# Patient Record
Sex: MALE | Employment: FULL TIME | URBAN - METROPOLITAN AREA
[De-identification: names, ages, dates, MRNs, and addresses within clinical notes are randomized per-mention and may not be internally consistent; named-entity substitution may affect disease eponyms.]

---

## 2017-03-08 ENCOUNTER — PATIENT OUTREACH (OUTPATIENT)
Dept: INTERNAL MEDICINE CLINIC | Facility: CLINIC | Age: 52
End: 2017-03-08

## 2017-03-08 NOTE — PROGRESS NOTES
Reminder letter mailed to patient advising him to complete, labs, eye exam and schedule a follow up appointment.

## 2017-11-10 ENCOUNTER — TELEPHONE (OUTPATIENT)
Dept: INTERNAL MEDICINE CLINIC | Facility: CLINIC | Age: 52
End: 2017-11-10

## 2017-11-10 NOTE — TELEPHONE ENCOUNTER
Patient's spouse calling to request order for blood work for patient. Patient is new patient and is changing with Dr. Sean Long and has appointment on 01/19/18.  Please call patient back

## 2017-11-13 NOTE — TELEPHONE ENCOUNTER
Wife informed pt will need to establish with Dr Camacho Chaparro first than labs will be ordered at time of visit with him.

## 2018-01-19 ENCOUNTER — OFFICE VISIT (OUTPATIENT)
Dept: INTERNAL MEDICINE CLINIC | Facility: CLINIC | Age: 53
End: 2018-01-19

## 2018-01-19 ENCOUNTER — LAB ENCOUNTER (OUTPATIENT)
Dept: LAB | Age: 53
End: 2018-01-19
Attending: INTERNAL MEDICINE
Payer: COMMERCIAL

## 2018-01-19 VITALS
HEART RATE: 78 BPM | RESPIRATION RATE: 19 BRPM | DIASTOLIC BLOOD PRESSURE: 82 MMHG | BODY MASS INDEX: 28.1 KG/M2 | TEMPERATURE: 98 F | WEIGHT: 200.75 LBS | HEIGHT: 70.75 IN | SYSTOLIC BLOOD PRESSURE: 120 MMHG

## 2018-01-19 DIAGNOSIS — E78.00 HYPERCHOLESTEROLEMIA: ICD-10-CM

## 2018-01-19 DIAGNOSIS — Z12.11 SCREENING FOR COLORECTAL CANCER: ICD-10-CM

## 2018-01-19 DIAGNOSIS — E66.3 OVERWEIGHT (BMI 25.0-29.9): ICD-10-CM

## 2018-01-19 DIAGNOSIS — E11.9 CONTROLLED TYPE 2 DIABETES MELLITUS WITHOUT COMPLICATION, WITHOUT LONG-TERM CURRENT USE OF INSULIN (HCC): ICD-10-CM

## 2018-01-19 DIAGNOSIS — Z12.12 SCREENING FOR COLORECTAL CANCER: ICD-10-CM

## 2018-01-19 DIAGNOSIS — Z12.5 SCREENING PSA (PROSTATE SPECIFIC ANTIGEN): ICD-10-CM

## 2018-01-19 DIAGNOSIS — M51.9 LUMBAR DISC DISEASE: ICD-10-CM

## 2018-01-19 DIAGNOSIS — E11.9 CONTROLLED TYPE 2 DIABETES MELLITUS WITHOUT COMPLICATION, WITHOUT LONG-TERM CURRENT USE OF INSULIN (HCC): Primary | ICD-10-CM

## 2018-01-19 LAB
ALBUMIN SERPL-MCNC: 3.9 G/DL (ref 3.5–4.8)
ALP LIVER SERPL-CCNC: 64 U/L (ref 45–117)
ALT SERPL-CCNC: 36 U/L (ref 17–63)
AST SERPL-CCNC: 25 U/L (ref 15–41)
BASOPHILS # BLD AUTO: 0.03 X10(3) UL (ref 0–0.1)
BASOPHILS NFR BLD AUTO: 0.4 %
BILIRUB SERPL-MCNC: 0.9 MG/DL (ref 0.1–2)
BILIRUB UR QL STRIP.AUTO: NEGATIVE
BUN BLD-MCNC: 13 MG/DL (ref 8–20)
CALCIUM BLD-MCNC: 8.2 MG/DL (ref 8.3–10.3)
CHLORIDE: 106 MMOL/L (ref 101–111)
CHOLEST SMN-MCNC: 117 MG/DL (ref ?–200)
CO2: 26 MMOL/L (ref 22–32)
COLOR UR AUTO: YELLOW
COMPLEXED PSA SERPL-MCNC: 0.33 NG/ML (ref 0.01–4)
CREAT BLD-MCNC: 0.72 MG/DL (ref 0.7–1.3)
CREAT UR-SCNC: 340 MG/DL
EOSINOPHIL # BLD AUTO: 0.09 X10(3) UL (ref 0–0.3)
EOSINOPHIL NFR BLD AUTO: 1.3 %
ERYTHROCYTE [DISTWIDTH] IN BLOOD BY AUTOMATED COUNT: 11.9 % (ref 11.5–16)
EST. AVERAGE GLUCOSE BLD GHB EST-MCNC: 226 MG/DL (ref 68–126)
GLUCOSE BLD-MCNC: 168 MG/DL (ref 70–99)
GLUCOSE UR STRIP.AUTO-MCNC: NEGATIVE MG/DL
HBA1C MFR BLD HPLC: 9.5 % (ref ?–5.7)
HCT VFR BLD AUTO: 45.8 % (ref 37–53)
HDLC SERPL-MCNC: 27 MG/DL (ref 45–?)
HDLC SERPL: 4.33 {RATIO} (ref ?–4.97)
HGB BLD-MCNC: 16.3 G/DL (ref 13–17)
IMMATURE GRANULOCYTE COUNT: 0.04 X10(3) UL (ref 0–1)
IMMATURE GRANULOCYTE RATIO %: 0.6 %
KETONES UR STRIP.AUTO-MCNC: NEGATIVE MG/DL
LDLC SERPL DIRECT ASSAY-MCNC: 36 MG/DL (ref ?–100)
LEUKOCYTE ESTERASE UR QL STRIP.AUTO: NEGATIVE
LYMPHOCYTES # BLD AUTO: 2.91 X10(3) UL (ref 0.9–4)
LYMPHOCYTES NFR BLD AUTO: 40.6 %
M PROTEIN MFR SERPL ELPH: 7 G/DL (ref 6.1–8.3)
MCH RBC QN AUTO: 31.8 PG (ref 27–33.2)
MCHC RBC AUTO-ENTMCNC: 35.6 G/DL (ref 31–37)
MCV RBC AUTO: 89.3 FL (ref 80–99)
MICROALBUMIN UR-MCNC: 9.4 MG/DL
MICROALBUMIN/CREAT 24H UR-RTO: 27.6 UG/MG (ref ?–30)
MONOCYTES # BLD AUTO: 0.65 X10(3) UL (ref 0.1–0.6)
MONOCYTES NFR BLD AUTO: 9.1 %
NEUTROPHIL ABS PRELIM: 3.44 X10 (3) UL (ref 1.3–6.7)
NEUTROPHILS # BLD AUTO: 3.44 X10(3) UL (ref 1.3–6.7)
NEUTROPHILS NFR BLD AUTO: 48 %
NITRITE UR QL STRIP.AUTO: NEGATIVE
NONHDLC SERPL-MCNC: 90 MG/DL (ref ?–130)
PH UR STRIP.AUTO: 5 [PH] (ref 4.5–8)
PLATELET # BLD AUTO: 176 10(3)UL (ref 150–450)
POTASSIUM SERPL-SCNC: 4.1 MMOL/L (ref 3.6–5.1)
PROT UR STRIP.AUTO-MCNC: 30 MG/DL
RBC # BLD AUTO: 5.13 X10(6)UL (ref 4.3–5.7)
RBC UR QL AUTO: NEGATIVE
RED CELL DISTRIBUTION WIDTH-SD: 38.5 FL (ref 35.1–46.3)
SODIUM SERPL-SCNC: 138 MMOL/L (ref 136–144)
SP GR UR STRIP.AUTO: 1.03 (ref 1–1.03)
TRIGL SERPL-MCNC: 493 MG/DL (ref ?–150)
TSI SER-ACNC: 0.74 MIU/ML (ref 0.35–5.5)
UROBILINOGEN UR STRIP.AUTO-MCNC: <2 MG/DL
WBC # BLD AUTO: 7.2 X10(3) UL (ref 4–13)

## 2018-01-19 PROCEDURE — 84153 ASSAY OF PSA TOTAL: CPT | Performed by: INTERNAL MEDICINE

## 2018-01-19 PROCEDURE — 82570 ASSAY OF URINE CREATININE: CPT | Performed by: INTERNAL MEDICINE

## 2018-01-19 PROCEDURE — 83036 HEMOGLOBIN GLYCOSYLATED A1C: CPT | Performed by: INTERNAL MEDICINE

## 2018-01-19 PROCEDURE — 80061 LIPID PANEL: CPT | Performed by: INTERNAL MEDICINE

## 2018-01-19 PROCEDURE — 80050 GENERAL HEALTH PANEL: CPT | Performed by: INTERNAL MEDICINE

## 2018-01-19 PROCEDURE — 81001 URINALYSIS AUTO W/SCOPE: CPT | Performed by: INTERNAL MEDICINE

## 2018-01-19 PROCEDURE — 82043 UR ALBUMIN QUANTITATIVE: CPT | Performed by: INTERNAL MEDICINE

## 2018-01-19 PROCEDURE — 36415 COLL VENOUS BLD VENIPUNCTURE: CPT | Performed by: INTERNAL MEDICINE

## 2018-01-19 PROCEDURE — 99214 OFFICE O/P EST MOD 30 MIN: CPT | Performed by: INTERNAL MEDICINE

## 2018-01-19 PROCEDURE — 83721 ASSAY OF BLOOD LIPOPROTEIN: CPT | Performed by: INTERNAL MEDICINE

## 2018-01-19 RX ORDER — ATORVASTATIN CALCIUM 40 MG/1
40 TABLET, FILM COATED ORAL
Qty: 90 TABLET | Refills: 1 | Status: SHIPPED | OUTPATIENT
Start: 2018-01-19 | End: 2018-09-11

## 2018-01-19 RX ORDER — GLIMEPIRIDE 2 MG/1
2 TABLET ORAL 2 TIMES DAILY WITH MEALS
Qty: 180 TABLET | Refills: 1 | Status: SHIPPED | OUTPATIENT
Start: 2018-01-19 | End: 2018-09-11

## 2018-01-19 RX ORDER — LANCETS 33 GAUGE
1 EACH MISCELLANEOUS 2 TIMES DAILY
Qty: 200 EACH | Refills: 0 | Status: SHIPPED | OUTPATIENT
Start: 2018-01-19 | End: 2019-02-12

## 2018-01-20 PROBLEM — M51.9 LUMBAR DISC DISEASE: Status: ACTIVE | Noted: 2018-01-20

## 2018-01-20 PROBLEM — E66.3 OVERWEIGHT (BMI 25.0-29.9): Status: ACTIVE | Noted: 2018-01-20

## 2018-01-20 NOTE — PROGRESS NOTES
Patient presents with:  Establish Care: ongoing medical mgmt of chronic disease      HPI: The pt presents today to establish PCP and for ongoing medical mgmt of chronic disease burden. Former patient of Dr. Masha Hu within [facility].   [facility] Take 1 tablet (40 mg total) by mouth once daily. , Disp: 90 tablet, Rfl: 1  •  LITETOUCH LANCETS Does not apply Misc, 1 lancet by Finger stick route 2 (two) times daily.  E11.42, Disp: 200 each, Rfl: 0  •  Glucose Blood In Vitro Strip, Test blood sugar twice monofilament/sensation of both feet is normal.  Pulsation pedal pulse exam of both lower legs/feet is normal as well.    Back - spine is midline and w/o tenderness; ROM is preserved  Neuro - CNs 2-12 grossly intact, no focal deficits; 2+ DTRs  Psych - nl mo [facility]  [address] [facility],4Th Floor, [address], [facility], [address]  T: [id number]; F: [phone]       Orders Placed This Encounter      HGB A1C - CPX      LIPID PANEL - CPX      TSH - CPX      URINALYSIS, ROUTINE - CPX      CBC W/DIFF

## 2018-02-01 ENCOUNTER — TELEPHONE (OUTPATIENT)
Dept: INTERNAL MEDICINE CLINIC | Facility: CLINIC | Age: 53
End: 2018-02-01

## 2018-04-16 DIAGNOSIS — E11.9 CONTROLLED TYPE 2 DIABETES MELLITUS WITHOUT COMPLICATION, WITHOUT LONG-TERM CURRENT USE OF INSULIN (HCC): ICD-10-CM

## 2018-04-16 NOTE — TELEPHONE ENCOUNTER
Refill requested: One Touch Ultra Blue     Passed protocol    Relevant Labs: 1/19/18 M/A and HA1C 9.5  Last OV / RTC advised: 1/19/18  Appt Scheduled:   Your appointments     Date & Time Appointment Department [facility])    Jun 18, 2018  3:30 PM CDT [place]

## 2018-06-17 ENCOUNTER — HOSPITAL ENCOUNTER (OUTPATIENT)
Age: 53
Discharge: HOME | End: 2018-06-17
Payer: COMMERCIAL

## 2018-06-17 VITALS
HEIGHT: 71 IN | OXYGEN SATURATION: 96 % | RESPIRATION RATE: 20 BRPM | WEIGHT: 216 LBS | HEART RATE: 79 BPM | DIASTOLIC BLOOD PRESSURE: 85 MMHG | TEMPERATURE: 100 F | BODY MASS INDEX: 30.24 KG/M2 | SYSTOLIC BLOOD PRESSURE: 135 MMHG

## 2018-06-17 DIAGNOSIS — R09.82 POST-NASAL DRIP: ICD-10-CM

## 2018-06-17 DIAGNOSIS — B02.9 HERPES ZOSTER WITHOUT COMPLICATION: Primary | ICD-10-CM

## 2018-06-17 PROCEDURE — 99213 OFFICE O/P EST LOW 20 MIN: CPT

## 2018-06-17 PROCEDURE — 99204 OFFICE O/P NEW MOD 45 MIN: CPT

## 2018-06-17 RX ORDER — VALACYCLOVIR HYDROCHLORIDE 1 G/1
1 TABLET, FILM COATED ORAL 3 TIMES DAILY
Qty: 21 TABLET | Refills: 0 | Status: SHIPPED | OUTPATIENT
Start: 2018-06-17 | End: 2018-06-24

## 2018-06-17 NOTE — ED PROVIDER NOTES
Patient Seen in: [facility] In [facility]    History   Patient presents with:  Rash Skin Problem (integumentary)    Stated Complaint: RASH     HPI     52-year-old male here with complaint of a several day history of a painful burning rash to Current:/85   Pulse 79   Temp 99.6 °F (37.6 °C) (Temporal)   Resp 20   Ht 180.3 cm (5' 11\")   Wt 98 kg   SpO2 96%   BMI 30.13 kg/m²         Physical Exam   Constitutional: He is oriented to person, place, and time.  He appears well-developed and well I discussed the plan of care with the patient, who expresses understanding. All questions and concerns are addressed to the patients satisfaction prior to discharge today.            Disposition and Plan     Clinical Impression:  Herpes zoster without compl

## 2018-07-30 DIAGNOSIS — E11.9 CONTROLLED TYPE 2 DIABETES MELLITUS WITHOUT COMPLICATION, WITHOUT LONG-TERM CURRENT USE OF INSULIN (HCC): ICD-10-CM

## 2018-07-30 NOTE — TELEPHONE ENCOUNTER
Patient requesting refill for:  [facility] SYS [facility] In [facility], [address]  [address] [place] [address], [phone], [phone]

## 2018-07-31 NOTE — TELEPHONE ENCOUNTER
Requesting ONETOUCH ULTRA BLUE In Vitro Strip  LOV: 01/19/18  Filled: 04/16/2018 #200 with 0 refills

## 2018-09-11 DIAGNOSIS — E78.00 HYPERCHOLESTEROLEMIA: ICD-10-CM

## 2018-09-11 DIAGNOSIS — E11.9 CONTROLLED TYPE 2 DIABETES MELLITUS WITHOUT COMPLICATION, WITHOUT LONG-TERM CURRENT USE OF INSULIN (HCC): ICD-10-CM

## 2018-09-11 NOTE — TELEPHONE ENCOUNTER
Glimepiride 2 mg 1 tab bid filled 1-19-18 180 with 1 refill     Metformin 850 mg 1 tab bid filled  1-19-18 180 with 1 refill     Atorvastatin 40 mg 1 tab daily filled 1-19-18 90 with 1 refill

## 2018-09-12 RX ORDER — ATORVASTATIN CALCIUM 40 MG/1
40 TABLET, FILM COATED ORAL DAILY
Qty: 90 TABLET | Refills: 0 | Status: SHIPPED | OUTPATIENT
Start: 2018-09-12 | End: 2018-11-30

## 2018-09-12 NOTE — TELEPHONE ENCOUNTER
Refill requested: Glimepiride 2 mg + Atorvastatin 40 mg + Metformin 850 mg     Failed protocol - Glimepiride + Metformin       Last refill: 1/19/18 Metformin 850 mg #180 1R + Glimepiride 2 mg #1801R + Atorvastatin 40 mg #90 1R    Relevant Labs: Lipid _+ A1

## 2018-09-13 RX ORDER — GLIMEPIRIDE 2 MG/1
2 TABLET ORAL 2 TIMES DAILY WITH MEALS
Qty: 180 TABLET | Refills: 0 | Status: SHIPPED | OUTPATIENT
Start: 2018-09-13 | End: 2019-02-22

## 2018-10-26 ENCOUNTER — TELEPHONE (OUTPATIENT)
Dept: INTERNAL MEDICINE CLINIC | Facility: CLINIC | Age: 53
End: 2018-10-26

## 2018-10-26 DIAGNOSIS — E11.9 CONTROLLED TYPE 2 DIABETES MELLITUS WITHOUT COMPLICATION, WITHOUT LONG-TERM CURRENT USE OF INSULIN (HCC): ICD-10-CM

## 2018-10-26 DIAGNOSIS — E11.65 UNCONTROLLED TYPE 2 DIABETES MELLITUS WITH HYPERGLYCEMIA, WITHOUT LONG-TERM CURRENT USE OF INSULIN (HCC): Primary | ICD-10-CM
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
## 2018-10-26 NOTE — TELEPHONE ENCOUNTER
Find out specifically - Is it lancets, testing strips, glucometer? Jamshid Hall. Valerie Mendez MD  Diplomate, American Board of Internal Medicine  [facility]  [address] [facility],4Th Floor, [address], [facility], [address]  T: [phone]; F: [address]

## 2018-10-28 DIAGNOSIS — E11.9 CONTROLLED TYPE 2 DIABETES MELLITUS WITHOUT COMPLICATION, WITHOUT LONG-TERM CURRENT USE OF INSULIN (HCC): ICD-10-CM

## 2018-10-29 RX ORDER — BLOOD-GLUCOSE METER
1 EACH MISCELLANEOUS 2 TIMES DAILY
Qty: 1 KIT | Refills: 0 | Status: SHIPPED | OUTPATIENT
Start: 2018-10-29 | End: 2019-02-12

## 2018-11-30 ENCOUNTER — LAB ENCOUNTER (OUTPATIENT)
Dept: LAB | Age: 53
End: 2018-11-30
Attending: INTERNAL MEDICINE
Payer: COMMERCIAL

## 2018-11-30 ENCOUNTER — OFFICE VISIT (OUTPATIENT)
Dept: INTERNAL MEDICINE CLINIC | Facility: CLINIC | Age: 53
End: 2018-11-30
Payer: COMMERCIAL

## 2018-11-30 VITALS
TEMPERATURE: 98 F | OXYGEN SATURATION: 95 % | DIASTOLIC BLOOD PRESSURE: 78 MMHG | HEIGHT: 70.75 IN | BODY MASS INDEX: 32.55 KG/M2 | WEIGHT: 232.5 LBS | SYSTOLIC BLOOD PRESSURE: 120 MMHG | HEART RATE: 71 BPM

## 2018-11-30 DIAGNOSIS — E11.65 UNCONTROLLED TYPE 2 DIABETES MELLITUS WITH HYPERGLYCEMIA, WITHOUT LONG-TERM CURRENT USE OF INSULIN (HCC): Primary | ICD-10-CM

## 2018-11-30 DIAGNOSIS — E78.00 HYPERCHOLESTEROLEMIA: ICD-10-CM

## 2018-11-30 DIAGNOSIS — E11.65 UNCONTROLLED TYPE 2 DIABETES MELLITUS WITH HYPERGLYCEMIA, WITHOUT LONG-TERM CURRENT USE OF INSULIN (HCC): ICD-10-CM

## 2018-11-30 DIAGNOSIS — E66.09 CLASS 1 OBESITY DUE TO EXCESS CALORIES WITH SERIOUS COMORBIDITY AND BODY MASS INDEX (BMI) OF 32.0 TO 32.9 IN ADULT: ICD-10-CM

## 2018-11-30 PROBLEM — E66.811 CLASS 1 OBESITY DUE TO EXCESS CALORIES WITH SERIOUS COMORBIDITY AND BODY MASS INDEX (BMI) OF 32.0 TO 32.9 IN ADULT: Chronic | Status: ACTIVE | Noted: 2018-01-20

## 2018-11-30 PROCEDURE — 82570 ASSAY OF URINE CREATININE: CPT | Performed by: INTERNAL MEDICINE

## 2018-11-30 PROCEDURE — 80053 COMPREHEN METABOLIC PANEL: CPT | Performed by: INTERNAL MEDICINE

## 2018-11-30 PROCEDURE — 80061 LIPID PANEL: CPT | Performed by: INTERNAL MEDICINE

## 2018-11-30 PROCEDURE — 83721 ASSAY OF BLOOD LIPOPROTEIN: CPT | Performed by: INTERNAL MEDICINE

## 2018-11-30 PROCEDURE — 99214 OFFICE O/P EST MOD 30 MIN: CPT | Performed by: INTERNAL MEDICINE

## 2018-11-30 PROCEDURE — 36415 COLL VENOUS BLD VENIPUNCTURE: CPT | Performed by: INTERNAL MEDICINE

## 2018-11-30 PROCEDURE — 82043 UR ALBUMIN QUANTITATIVE: CPT | Performed by: INTERNAL MEDICINE

## 2018-11-30 PROCEDURE — 83036 HEMOGLOBIN GLYCOSYLATED A1C: CPT | Performed by: INTERNAL MEDICINE

## 2018-11-30 RX ORDER — ATORVASTATIN CALCIUM 40 MG/1
40 TABLET, FILM COATED ORAL DAILY
Qty: 90 TABLET | Refills: 0 | Status: SHIPPED | OUTPATIENT
Start: 2018-11-30 | End: 2019-02-22

## 2018-11-30 RX ORDER — GLIMEPIRIDE 2 MG/1
2 TABLET ORAL 2 TIMES DAILY WITH MEALS
Qty: 180 TABLET | Refills: 0 | Status: CANCELLED | OUTPATIENT
Start: 2018-11-30

## 2018-11-30 NOTE — PATIENT INSTRUCTIONS
Onofre,    1. Get labs done. 2. Take new medication called INVOKANA which is a once-daily medication for diabetes. 3. Continue METFORMIN as scheduled. 4. Bye bye GLIMEPIRIDE. 5. See the ophthalmologist, Dr. Jg Marks. 6. See you back in 3 months.

## 2018-11-30 NOTE — PROGRESS NOTES
Patient presents with: Follow - Up: 6-month chronic medical conditions      HPI: Zhang Hernandezbuffy presents today for 6-month f/u chronic medical conditions as follows:    1. Uncontrolled DM2 w/ hyperglycemia w/o insulin - Due for labs. Due for Ophtho.   Compliant 240 g Oral Recon Soln, Take as directed by your physician., Disp: 1 Bottle, Rfl: 0    Social History    Tobacco Use      Smoking status: Former Smoker        Packs/day: 0.50        Quit date: 2012        Years since quittin.8      Smokeless tobacco Lashay Mckeon. Gwendlyn Jeans, MD  Diplomate, American Board of Internal Medicine  [facility]  [address], [facility], [address]  T: [phone]; F: [phone]     Meds & Refills for this Visit:  Requested Prescriptions     Signed Prescr

## 2018-12-01 RX ORDER — ICOSAPENT ETHYL 1000 MG/1
2 CAPSULE ORAL 2 TIMES DAILY
Qty: 120 CAPSULE | Refills: 5 | Status: SHIPPED | OUTPATIENT
Start: 2018-12-01 | End: 2019-10-13

## 2018-12-01 NOTE — PROGRESS NOTES
Script for [employer] sent to pharmacy. Pt notified via [address] Jyl Aid. Maria Gaudalupe Kennedy MD  Diplomate, American Board of Internal Medicine  [address] [facility]  [address] [place] [address],[address]Th Floor, [address], [facility], [address]  T: [phone]; F: [address]

## 2018-12-06 PROBLEM — Z12.11 SPECIAL SCREENING FOR MALIGNANT NEOPLASM OF COLON: Status: ACTIVE | Noted: 2018-12-06

## 2018-12-06 PROBLEM — K57.30 DIVERTICULOSIS OF LARGE INTESTINE WITHOUT PERFORATION OR ABSCESS WITHOUT BLEEDING: Status: ACTIVE | Noted: 2018-12-06

## 2018-12-06 PROBLEM — K63.5 POLYP OF COLON: Status: ACTIVE | Noted: 2018-12-06

## 2018-12-10 ENCOUNTER — TELEPHONE (OUTPATIENT)
Dept: INTERNAL MEDICINE CLINIC | Facility: CLINIC | Age: 53
End: 2018-12-10

## 2018-12-10 NOTE — TELEPHONE ENCOUNTER
Received a PA request for Vascepa. ID: [id number]    -Completed PA form and placed in Dr Chioma Byrd for signature.

## 2018-12-19 ENCOUNTER — TELEPHONE (OUTPATIENT)
Dept: INTERNAL MEDICINE CLINIC | Facility: CLINIC | Age: 53
End: 2018-12-19

## 2018-12-31 NOTE — TELEPHONE ENCOUNTER
Faxed back questionnaire to [facility] and placed in [employer] [facility] [employer] POD 1

## 2019-01-04 NOTE — TELEPHONE ENCOUNTER
In my Outbox. Fritz Morejon. Camacho Chaparro MD  Diplomate, American Board of Internal Medicine  [facility]  [address] [facility],4Th Floor, [address], [facility], [address]  T: [phone]; F: Kayli 5

## 2019-02-12 RX ORDER — LANCETS
1 EACH MISCELLANEOUS 2 TIMES DAILY
Qty: 2 BOX | Refills: 2 | Status: SHIPPED | OUTPATIENT
Start: 2019-02-12 | End: 2019-07-29

## 2019-02-12 RX ORDER — BLOOD SUGAR DIAGNOSTIC
STRIP MISCELLANEOUS
Qty: 200 STRIP | Refills: 2 | Status: SHIPPED | OUTPATIENT
Start: 2019-02-12 | End: 2019-02-22

## 2019-02-12 RX ORDER — BLOOD-GLUCOSE METER
1 EACH MISCELLANEOUS 2 TIMES DAILY
Qty: 1 KIT | Refills: 0 | Status: SHIPPED | OUTPATIENT
Start: 2019-02-12 | End: 2019-07-29

## 2019-02-12 NOTE — TELEPHONE ENCOUNTER
accu-chek lancets bid filled     Accu-chek test strips bid filled     accu-chek monitor filled     Insurance does not cover onetouch     LOV 11-30-18     Uncontrolled DM2 w/ hyperglycemia w/o insulin - Due for labs. Due for Ophtho.   Compliant w/ prescript

## 2019-02-22 ENCOUNTER — OFFICE VISIT (OUTPATIENT)
Dept: INTERNAL MEDICINE CLINIC | Facility: CLINIC | Age: 54
End: 2019-02-22
Payer: COMMERCIAL

## 2019-02-22 ENCOUNTER — TELEPHONE (OUTPATIENT)
Dept: INTERNAL MEDICINE CLINIC | Facility: CLINIC | Age: 54
End: 2019-02-22

## 2019-02-22 ENCOUNTER — APPOINTMENT (OUTPATIENT)
Dept: LAB | Age: 54
End: 2019-02-22
Attending: INTERNAL MEDICINE
Payer: COMMERCIAL

## 2019-02-22 VITALS
BODY MASS INDEX: 31.68 KG/M2 | RESPIRATION RATE: 16 BRPM | HEIGHT: 70.75 IN | WEIGHT: 226.25 LBS | DIASTOLIC BLOOD PRESSURE: 72 MMHG | SYSTOLIC BLOOD PRESSURE: 109 MMHG | HEART RATE: 64 BPM | TEMPERATURE: 98 F

## 2019-02-22 DIAGNOSIS — E11.9 CONTROLLED TYPE 2 DIABETES MELLITUS WITHOUT COMPLICATION, WITHOUT LONG-TERM CURRENT USE OF INSULIN (HCC): Primary | ICD-10-CM

## 2019-02-22 DIAGNOSIS — E11.9 CONTROLLED TYPE 2 DIABETES MELLITUS WITHOUT COMPLICATION, WITHOUT LONG-TERM CURRENT USE OF INSULIN (HCC): ICD-10-CM

## 2019-02-22 DIAGNOSIS — E66.09 CLASS 1 OBESITY DUE TO EXCESS CALORIES WITH SERIOUS COMORBIDITY AND BODY MASS INDEX (BMI) OF 31.0 TO 31.9 IN ADULT: ICD-10-CM

## 2019-02-22 DIAGNOSIS — E78.00 HYPERCHOLESTEROLEMIA: ICD-10-CM

## 2019-02-22 PROBLEM — Z12.11 SPECIAL SCREENING FOR MALIGNANT NEOPLASM OF COLON: Status: RESOLVED | Noted: 2018-12-06 | Resolved: 2019-02-22

## 2019-02-22 PROBLEM — E66.811 CLASS 1 OBESITY DUE TO EXCESS CALORIES WITH SERIOUS COMORBIDITY AND BODY MASS INDEX (BMI) OF 31.0 TO 31.9 IN ADULT: Status: ACTIVE | Noted: 2018-01-20

## 2019-02-22 LAB
ALBUMIN SERPL-MCNC: 4.2 G/DL (ref 3.4–5)
ALBUMIN/GLOB SERPL: 1.4 {RATIO} (ref 1–2)
ALP LIVER SERPL-CCNC: 57 U/L (ref 45–117)
ALT SERPL-CCNC: 38 U/L (ref 16–61)
ANION GAP SERPL CALC-SCNC: 8 MMOL/L (ref 0–18)
AST SERPL-CCNC: 18 U/L (ref 15–37)
BILIRUB SERPL-MCNC: 0.9 MG/DL (ref 0.1–2)
BUN BLD-MCNC: 16 MG/DL (ref 7–18)
BUN/CREAT SERPL: 21.1 (ref 10–20)
CALCIUM BLD-MCNC: 8.4 MG/DL (ref 8.5–10.1)
CHLORIDE SERPL-SCNC: 108 MMOL/L (ref 98–107)
CHOLEST SMN-MCNC: 84 MG/DL (ref ?–200)
CO2 SERPL-SCNC: 25 MMOL/L (ref 21–32)
CREAT BLD-MCNC: 0.76 MG/DL (ref 0.7–1.3)
CREAT UR-SCNC: 281 MG/DL
EST. AVERAGE GLUCOSE BLD GHB EST-MCNC: 189 MG/DL (ref 68–126)
GLOBULIN PLAS-MCNC: 3 G/DL (ref 2.8–4.4)
GLUCOSE BLD-MCNC: 184 MG/DL (ref 70–99)
HBA1C MFR BLD HPLC: 8.2 % (ref ?–5.7)
HDLC SERPL-MCNC: 31 MG/DL (ref 40–59)
LDLC SERPL CALC-MCNC: 10 MG/DL (ref ?–100)
M PROTEIN MFR SERPL ELPH: 7.2 G/DL (ref 6.4–8.2)
MICROALBUMIN UR-MCNC: 5.89 MG/DL
MICROALBUMIN/CREAT 24H UR-RTO: 21 UG/MG (ref ?–30)
NONHDLC SERPL-MCNC: 53 MG/DL (ref ?–130)
OSMOLALITY SERPL CALC.SUM OF ELEC: 298 MOSM/KG (ref 275–295)
POTASSIUM SERPL-SCNC: 4.2 MMOL/L (ref 3.5–5.1)
SODIUM SERPL-SCNC: 141 MMOL/L (ref 136–145)
TRIGL SERPL-MCNC: 213 MG/DL (ref 30–149)
VLDLC SERPL CALC-MCNC: 43 MG/DL (ref 0–30)

## 2019-02-22 PROCEDURE — 99214 OFFICE O/P EST MOD 30 MIN: CPT | Performed by: INTERNAL MEDICINE

## 2019-02-22 PROCEDURE — 82570 ASSAY OF URINE CREATININE: CPT | Performed by: INTERNAL MEDICINE

## 2019-02-22 PROCEDURE — 82043 UR ALBUMIN QUANTITATIVE: CPT | Performed by: INTERNAL MEDICINE

## 2019-02-22 PROCEDURE — 80061 LIPID PANEL: CPT | Performed by: INTERNAL MEDICINE

## 2019-02-22 PROCEDURE — 80053 COMPREHEN METABOLIC PANEL: CPT | Performed by: INTERNAL MEDICINE

## 2019-02-22 PROCEDURE — 36415 COLL VENOUS BLD VENIPUNCTURE: CPT | Performed by: INTERNAL MEDICINE

## 2019-02-22 PROCEDURE — 83036 HEMOGLOBIN GLYCOSYLATED A1C: CPT | Performed by: INTERNAL MEDICINE

## 2019-02-22 RX ORDER — CANAGLIFLOZIN 100 MG/1
100 TABLET, FILM COATED ORAL DAILY
Qty: 90 TABLET | Refills: 1 | Status: SHIPPED | OUTPATIENT
Start: 2019-02-22 | End: 2019-02-22

## 2019-02-22 RX ORDER — EMPAGLIFLOZIN 10 MG/1
1 TABLET, FILM COATED ORAL EVERY MORNING
Qty: 90 TABLET | Refills: 1 | Status: SHIPPED | OUTPATIENT
Start: 2019-02-22 | End: 2019-03-24

## 2019-02-22 RX ORDER — ICOSAPENT ETHYL 1000 MG/1
2 CAPSULE ORAL 2 TIMES DAILY
COMMUNITY
Start: 2019-02-20 | End: 2020-07-20

## 2019-02-22 RX ORDER — BLOOD SUGAR DIAGNOSTIC
STRIP MISCELLANEOUS
Qty: 200 STRIP | Refills: 2 | Status: SHIPPED | OUTPATIENT
Start: 2019-02-22 | End: 2019-07-29

## 2019-02-22 RX ORDER — ATORVASTATIN CALCIUM 40 MG/1
40 TABLET, FILM COATED ORAL DAILY
Qty: 90 TABLET | Refills: 1 | Status: SHIPPED | OUTPATIENT
Start: 2019-02-22 | End: 2019-11-12

## 2019-02-22 RX ORDER — GLIMEPIRIDE 2 MG/1
2 TABLET ORAL 2 TIMES DAILY WITH MEALS
Qty: 180 TABLET | Refills: 1 | Status: SHIPPED | OUTPATIENT
Start: 2019-02-22 | End: 2019-11-15

## 2019-02-22 NOTE — PROGRESS NOTES
Patient presents with: Follow - Up: 3 month follow up       HPI: Adiel Pearson presents today for 3-month f/u chronic medical conditions as follows:    1. Controlled DM2 - Stable on prescription medication. No new issues.    2. Hypercholesterolemia - Stable on Disp: , Rfl:   •  VASCEPA 1 g Oral Cap, , Disp: , Rfl:     Social History    Tobacco Use      Smoking status: Former Smoker        Packs/day: 0.50        Years: 10.00        Pack years: 5        Quit date: 2012        Years since quittin.0      [facility]  [address] [facility],4Th Floor, [address], [facility], [address]  T: [phone]; F: [phone]     Orders Placed This Encounter      HGB A1C - CPX      LIPID PANEL - CPX      Microalb/Creat Ratio, Random Urine      COMP METABOLIC PAN

## 2019-02-22 NOTE — PATIENT INSTRUCTIONS
Onofre,  1. Continue prescription medication. 2. Get labs done today. 3. Get a heart scan done (your wife needs it too).   $75 special starts on 3/1 and goes thru 4/30.    4. My show is called \"TO [address] [place] [address] DR. Tobias Tay" and it airs live on [place] ev

## 2019-02-23 NOTE — TELEPHONE ENCOUNTER
Script for [employer] sent to pharmacy. Notify pt. Nathan Cartagena. Karin Javier MD  Diplomate, American Board of Internal Medicine  [facility]  [address] [facility],4Th Floor, [address], [facility], [address] [place] [address]  T: [phone]; F: [address]

## 2019-02-25 DIAGNOSIS — E11.65 UNCONTROLLED TYPE 2 DIABETES MELLITUS WITH HYPERGLYCEMIA, WITHOUT LONG-TERM CURRENT USE OF INSULIN (HCC): ICD-10-CM

## 2019-02-25 RX ORDER — CANAGLIFLOZIN 100 MG/1
TABLET, FILM COATED ORAL
Qty: 90 TABLET | Refills: 0 | OUTPATIENT
Start: 2019-02-25

## 2019-03-19 ENCOUNTER — PATIENT MESSAGE (OUTPATIENT)
Dept: INTERNAL MEDICINE CLINIC | Facility: CLINIC | Age: 54
End: 2019-03-19

## 2019-07-10 ENCOUNTER — PATIENT OUTREACH (OUTPATIENT)
Dept: INTERNAL MEDICINE CLINIC | Facility: CLINIC | Age: 54
End: 2019-07-10

## 2019-07-29 ENCOUNTER — ANESTHESIA EVENT (OUTPATIENT)
Dept: SURGERY | Facility: HOSPITAL | Age: 54
End: 2019-07-29
Payer: COMMERCIAL

## 2019-07-29 ENCOUNTER — HOSPITAL ENCOUNTER (OUTPATIENT)
Facility: HOSPITAL | Age: 54
Setting detail: OBSERVATION
LOS: 1 days | Discharge: HOME | End: 2019-07-29
Attending: EMERGENCY MEDICINE | Admitting: SURGERY
Payer: COMMERCIAL

## 2019-07-29 ENCOUNTER — APPOINTMENT (OUTPATIENT)
Dept: CT IMAGING | Facility: HOSPITAL | Age: 54
End: 2019-07-29
Attending: EMERGENCY MEDICINE
Payer: COMMERCIAL

## 2019-07-29 ENCOUNTER — ANESTHESIA (OUTPATIENT)
Dept: SURGERY | Facility: HOSPITAL | Age: 54
End: 2019-07-29
Payer: COMMERCIAL

## 2019-07-29 VITALS
RESPIRATION RATE: 16 BRPM | WEIGHT: 225 LBS | HEIGHT: 71 IN | SYSTOLIC BLOOD PRESSURE: 113 MMHG | TEMPERATURE: 98 F | BODY MASS INDEX: 31.5 KG/M2 | OXYGEN SATURATION: 95 % | DIASTOLIC BLOOD PRESSURE: 74 MMHG | HEART RATE: 66 BPM

## 2019-07-29 DIAGNOSIS — K42.0 INCARCERATED UMBILICAL HERNIA: Primary | ICD-10-CM

## 2019-07-29 PROBLEM — E11.9 TYPE 2 DIABETES MELLITUS WITHOUT COMPLICATION, WITHOUT LONG-TERM CURRENT USE OF INSULIN (HCC): Status: ACTIVE | Noted: 2018-01-19

## 2019-07-29 PROBLEM — R73.9 HYPERGLYCEMIA: Status: ACTIVE | Noted: 2019-07-29

## 2019-07-29 LAB
ALBUMIN SERPL-MCNC: 3.9 G/DL (ref 3.4–5)
ALBUMIN/GLOB SERPL: 1.3 {RATIO} (ref 1–2)
ALP LIVER SERPL-CCNC: 61 U/L (ref 45–117)
ALT SERPL-CCNC: 40 U/L (ref 16–61)
ANION GAP SERPL CALC-SCNC: 8 MMOL/L (ref 0–18)
AST SERPL-CCNC: 27 U/L (ref 15–37)
BASOPHILS # BLD AUTO: 0.02 X10(3) UL (ref 0–0.2)
BASOPHILS NFR BLD AUTO: 0.3 %
BILIRUB SERPL-MCNC: 0.6 MG/DL (ref 0.1–2)
BUN BLD-MCNC: 16 MG/DL (ref 7–18)
BUN/CREAT SERPL: 19 (ref 10–20)
CALCIUM BLD-MCNC: 8.1 MG/DL (ref 8.5–10.1)
CHLORIDE SERPL-SCNC: 110 MMOL/L (ref 98–112)
CO2 SERPL-SCNC: 23 MMOL/L (ref 21–32)
CREAT BLD-MCNC: 0.84 MG/DL (ref 0.7–1.3)
DEPRECATED RDW RBC AUTO: 40.5 FL (ref 35.1–46.3)
EOSINOPHIL # BLD AUTO: 0.13 X10(3) UL (ref 0–0.7)
EOSINOPHIL NFR BLD AUTO: 2.1 %
ERYTHROCYTE [DISTWIDTH] IN BLOOD BY AUTOMATED COUNT: 12.3 % (ref 11–15)
GLOBULIN PLAS-MCNC: 3.1 G/DL (ref 2.8–4.4)
GLUCOSE BLD-MCNC: 135 MG/DL (ref 70–99)
GLUCOSE BLD-MCNC: 170 MG/DL (ref 70–99)
HCT VFR BLD AUTO: 47 % (ref 39–53)
HGB BLD-MCNC: 16.2 G/DL (ref 13–17.5)
IMM GRANULOCYTES # BLD AUTO: 0.02 X10(3) UL (ref 0–1)
IMM GRANULOCYTES NFR BLD: 0.3 %
LACTATE SERPL-SCNC: 0.8 MMOL/L (ref 0.4–2)
LACTATE SERPL-SCNC: 2.1 MMOL/L (ref 0.4–2)
LYMPHOCYTES # BLD AUTO: 2.28 X10(3) UL (ref 1–4)
LYMPHOCYTES NFR BLD AUTO: 36.3 %
M PROTEIN MFR SERPL ELPH: 7 G/DL (ref 6.4–8.2)
MCH RBC QN AUTO: 31.2 PG (ref 26–34)
MCHC RBC AUTO-ENTMCNC: 34.5 G/DL (ref 31–37)
MCV RBC AUTO: 90.4 FL (ref 80–100)
MONOCYTES # BLD AUTO: 0.59 X10(3) UL (ref 0.1–1)
MONOCYTES NFR BLD AUTO: 9.4 %
NEUTROPHILS # BLD AUTO: 3.24 X10 (3) UL (ref 1.5–7.7)
NEUTROPHILS # BLD AUTO: 3.24 X10(3) UL (ref 1.5–7.7)
NEUTROPHILS NFR BLD AUTO: 51.6 %
OSMOLALITY SERPL CALC.SUM OF ELEC: 295 MOSM/KG (ref 275–295)
PLATELET # BLD AUTO: 175 10(3)UL (ref 150–450)
POTASSIUM SERPL-SCNC: 4.1 MMOL/L (ref 3.5–5.1)
RBC # BLD AUTO: 5.2 X10(6)UL (ref 4.3–5.7)
SODIUM SERPL-SCNC: 141 MMOL/L (ref 136–145)
WBC # BLD AUTO: 6.3 X10(3) UL (ref 4–11)

## 2019-07-29 PROCEDURE — 99253 IP/OBS CNSLTJ NEW/EST LOW 45: CPT | Performed by: SURGERY

## 2019-07-29 PROCEDURE — 74177 CT ABD & PELVIS W/CONTRAST: CPT | Performed by: EMERGENCY MEDICINE

## 2019-07-29 PROCEDURE — 0WUF0JZ SUPPLEMENT ABDOMINAL WALL WITH SYNTHETIC SUBSTITUTE, OPEN APPROACH: ICD-10-PCS | Performed by: SURGERY

## 2019-07-29 PROCEDURE — 99252 IP/OBS CONSLTJ NEW/EST SF 35: CPT | Performed by: STUDENT IN AN ORGANIZED HEALTH CARE EDUCATION/TRAINING PROGRAM

## 2019-07-29 DEVICE — VENTRALEX ST HERNIA PATCH
Type: IMPLANTABLE DEVICE | Site: UMBILICAL | Status: FUNCTIONAL
Brand: VENTRALEX ST HERNIA PATCH

## 2019-07-29 RX ORDER — HEPARIN SODIUM 5000 [USP'U]/ML
5000 INJECTION, SOLUTION INTRAVENOUS; SUBCUTANEOUS EVERY 8 HOURS SCHEDULED
Status: DISCONTINUED | OUTPATIENT
Start: 2019-07-29 | End: 2019-07-29

## 2019-07-29 RX ORDER — HYDROCODONE BITARTRATE AND ACETAMINOPHEN 5; 325 MG/1; MG/1
2 TABLET ORAL AS NEEDED
Status: DISCONTINUED | OUTPATIENT
Start: 2019-07-29 | End: 2019-07-29 | Stop reason: HOSPADM

## 2019-07-29 RX ORDER — NALOXONE HYDROCHLORIDE 0.4 MG/ML
80 INJECTION, SOLUTION INTRAMUSCULAR; INTRAVENOUS; SUBCUTANEOUS AS NEEDED
Status: DISCONTINUED | OUTPATIENT
Start: 2019-07-29 | End: 2019-07-29 | Stop reason: HOSPADM

## 2019-07-29 RX ORDER — DOCUSATE SODIUM 100 MG/1
100 CAPSULE, LIQUID FILLED ORAL 2 TIMES DAILY
Status: DISCONTINUED | OUTPATIENT
Start: 2019-07-29 | End: 2019-07-29

## 2019-07-29 RX ORDER — POLYETHYLENE GLYCOL 3350 17 G/17G
17 POWDER, FOR SOLUTION ORAL DAILY PRN
Status: DISCONTINUED | OUTPATIENT
Start: 2019-07-29 | End: 2019-07-29

## 2019-07-29 RX ORDER — HYDROCODONE BITARTRATE AND ACETAMINOPHEN 5; 325 MG/1; MG/1
1 TABLET ORAL AS NEEDED
Status: DISCONTINUED | OUTPATIENT
Start: 2019-07-29 | End: 2019-07-29 | Stop reason: HOSPADM

## 2019-07-29 RX ORDER — HYDROMORPHONE HYDROCHLORIDE 1 MG/ML
1 INJECTION, SOLUTION INTRAMUSCULAR; INTRAVENOUS; SUBCUTANEOUS EVERY 2 HOUR PRN
Status: DISCONTINUED | OUTPATIENT
Start: 2019-07-29 | End: 2019-07-29

## 2019-07-29 RX ORDER — HYDROMORPHONE HYDROCHLORIDE 1 MG/ML
0.5 INJECTION, SOLUTION INTRAMUSCULAR; INTRAVENOUS; SUBCUTANEOUS EVERY 30 MIN PRN
Status: DISCONTINUED | OUTPATIENT
Start: 2019-07-29 | End: 2019-07-29

## 2019-07-29 RX ORDER — HYDROMORPHONE HYDROCHLORIDE 1 MG/ML
0.5 INJECTION, SOLUTION INTRAMUSCULAR; INTRAVENOUS; SUBCUTANEOUS EVERY 30 MIN PRN
Status: ACTIVE | OUTPATIENT
Start: 2019-07-29 | End: 2019-07-29

## 2019-07-29 RX ORDER — IBUPROFEN 200 MG
200 TABLET ORAL EVERY 4 HOURS PRN
Status: DISCONTINUED | OUTPATIENT
Start: 2019-07-29 | End: 2019-07-29

## 2019-07-29 RX ORDER — DEXTROSE MONOHYDRATE 25 G/50ML
50 INJECTION, SOLUTION INTRAVENOUS
Status: DISCONTINUED | OUTPATIENT
Start: 2019-07-29 | End: 2019-07-29 | Stop reason: HOSPADM

## 2019-07-29 RX ORDER — INSULIN ASPART 100 [IU]/ML
INJECTION, SOLUTION INTRAVENOUS; SUBCUTANEOUS ONCE
Status: COMPLETED | OUTPATIENT
Start: 2019-07-29 | End: 2019-07-29

## 2019-07-29 RX ORDER — DEXTROSE AND SODIUM CHLORIDE 5; .45 G/100ML; G/100ML
INJECTION, SOLUTION INTRAVENOUS CONTINUOUS
Status: DISCONTINUED | OUTPATIENT
Start: 2019-07-29 | End: 2019-07-29

## 2019-07-29 RX ORDER — HYDROCODONE BITARTRATE AND ACETAMINOPHEN 5; 325 MG/1; MG/1
2 TABLET ORAL EVERY 4 HOURS PRN
Status: DISCONTINUED | OUTPATIENT
Start: 2019-07-29 | End: 2019-07-29

## 2019-07-29 RX ORDER — IBUPROFEN 600 MG/1
600 TABLET ORAL EVERY 4 HOURS PRN
Status: DISCONTINUED | OUTPATIENT
Start: 2019-07-29 | End: 2019-07-29

## 2019-07-29 RX ORDER — ONDANSETRON 2 MG/ML
4 INJECTION INTRAMUSCULAR; INTRAVENOUS EVERY 6 HOURS PRN
Status: DISCONTINUED | OUTPATIENT
Start: 2019-07-29 | End: 2019-07-29

## 2019-07-29 RX ORDER — ONDANSETRON 2 MG/ML
4 INJECTION INTRAMUSCULAR; INTRAVENOUS ONCE
Status: COMPLETED | OUTPATIENT
Start: 2019-07-29 | End: 2019-07-29

## 2019-07-29 RX ORDER — BUPIVACAINE HYDROCHLORIDE AND EPINEPHRINE 5; 5 MG/ML; UG/ML
INJECTION, SOLUTION EPIDURAL; INTRACAUDAL; PERINEURAL AS NEEDED
Status: DISCONTINUED | OUTPATIENT
Start: 2019-07-29 | End: 2019-07-29

## 2019-07-29 RX ORDER — METOCLOPRAMIDE HYDROCHLORIDE 5 MG/ML
10 INJECTION INTRAMUSCULAR; INTRAVENOUS AS NEEDED
Status: DISCONTINUED | OUTPATIENT
Start: 2019-07-29 | End: 2019-07-29 | Stop reason: HOSPADM

## 2019-07-29 RX ORDER — HYDROCODONE BITARTRATE AND ACETAMINOPHEN 5; 325 MG/1; MG/1
1 TABLET ORAL EVERY 4 HOURS PRN
Status: DISCONTINUED | OUTPATIENT
Start: 2019-07-29 | End: 2019-07-29

## 2019-07-29 RX ORDER — INSULIN ASPART 100 [IU]/ML
INJECTION, SOLUTION INTRAVENOUS; SUBCUTANEOUS
Status: DISCONTINUED
Start: 2019-07-29 | End: 2019-07-29 | Stop reason: WASHOUT

## 2019-07-29 RX ORDER — SODIUM CHLORIDE, SODIUM LACTATE, POTASSIUM CHLORIDE, CALCIUM CHLORIDE 600; 310; 30; 20 MG/100ML; MG/100ML; MG/100ML; MG/100ML
INJECTION, SOLUTION INTRAVENOUS CONTINUOUS
Status: DISCONTINUED | OUTPATIENT
Start: 2019-07-29 | End: 2019-07-29 | Stop reason: HOSPADM

## 2019-07-29 RX ORDER — DEXAMETHASONE SODIUM PHOSPHATE 4 MG/ML
4 VIAL (ML) INJECTION AS NEEDED
Status: DISCONTINUED | OUTPATIENT
Start: 2019-07-29 | End: 2019-07-29 | Stop reason: HOSPADM

## 2019-07-29 RX ORDER — ONDANSETRON 2 MG/ML
4 INJECTION INTRAMUSCULAR; INTRAVENOUS AS NEEDED
Status: DISCONTINUED | OUTPATIENT
Start: 2019-07-29 | End: 2019-07-29 | Stop reason: HOSPADM

## 2019-07-29 RX ORDER — ONDANSETRON 2 MG/ML
4 INJECTION INTRAMUSCULAR; INTRAVENOUS EVERY 4 HOURS PRN
Status: DISCONTINUED | OUTPATIENT
Start: 2019-07-29 | End: 2019-07-29

## 2019-07-29 RX ORDER — HYDROMORPHONE HYDROCHLORIDE 1 MG/ML
0.5 INJECTION, SOLUTION INTRAMUSCULAR; INTRAVENOUS; SUBCUTANEOUS EVERY 2 HOUR PRN
Status: DISCONTINUED | OUTPATIENT
Start: 2019-07-29 | End: 2019-07-29

## 2019-07-29 RX ORDER — IBUPROFEN 400 MG/1
400 TABLET ORAL EVERY 4 HOURS PRN
Status: DISCONTINUED | OUTPATIENT
Start: 2019-07-29 | End: 2019-07-29

## 2019-07-29 RX ORDER — LABETALOL HYDROCHLORIDE 5 MG/ML
5 INJECTION, SOLUTION INTRAVENOUS EVERY 5 MIN PRN
Status: DISCONTINUED | OUTPATIENT
Start: 2019-07-29 | End: 2019-07-29 | Stop reason: HOSPADM

## 2019-07-29 RX ORDER — DEXTROSE MONOHYDRATE 25 G/50ML
50 INJECTION, SOLUTION INTRAVENOUS
Status: DISCONTINUED | OUTPATIENT
Start: 2019-07-29 | End: 2019-07-29

## 2019-07-29 RX ORDER — HEPARIN SODIUM 5000 [USP'U]/ML
5000 INJECTION, SOLUTION INTRAVENOUS; SUBCUTANEOUS ONCE
Status: DISCONTINUED | OUTPATIENT
Start: 2019-07-29 | End: 2019-07-29

## 2019-07-29 RX ORDER — HYDROMORPHONE HYDROCHLORIDE 1 MG/ML
0.4 INJECTION, SOLUTION INTRAMUSCULAR; INTRAVENOUS; SUBCUTANEOUS EVERY 5 MIN PRN
Status: DISCONTINUED | OUTPATIENT
Start: 2019-07-29 | End: 2019-07-29 | Stop reason: HOSPADM

## 2019-07-29 RX ORDER — IBUPROFEN 600 MG/1
600 TABLET ORAL ONCE AS NEEDED
Status: DISCONTINUED | OUTPATIENT
Start: 2019-07-29 | End: 2019-07-29 | Stop reason: HOSPADM

## 2019-07-29 RX ORDER — HYDROCODONE BITARTRATE AND ACETAMINOPHEN 5; 325 MG/1; MG/1
1 TABLET ORAL EVERY 4 HOURS PRN
Qty: 20 TABLET | Refills: 0 | Status: SHIPPED | OUTPATIENT
Start: 2019-07-29 | End: 2020-07-20

## 2019-07-29 NOTE — PROGRESS NOTES
NURSING ADMISSION NOTE      Patient admitted via Cart  Oriented to room. Safety precautions initiated. Bed in low position. Call light in reach. Patient arrived at this time from ER. Alert and oriented. Denies need for pain medications. NPO.  IV f

## 2019-07-29 NOTE — PLAN OF CARE
NURSING ADMISSION NOTE      Patient admitted in ER  Oriented to room. Safety precautions initiated. Bed in low position. Call light in reach. Updated history, gave pt copy of advanced directives. rx updated pta meds. Gave report to RN in Scu.

## 2019-07-29 NOTE — ANESTHESIA PREPROCEDURE EVALUATION
PRE-OP EVALUATION    Patient Name: Facundo Edwards    Pre-op Diagnosis: Incarcerated hernia [K46.0]    Procedure(s):  REPAIR OF INCARCERATED UMBILICAL HERNIA    Surgeon(s) and Role:     * Rosalina Blankenship, DO - Primary    Pre-op vitals reviewed.   Temp: 9 0.50        Years: 10.00        Pack years: 5        Quit date: 2012        Years since quittin.4      Smokeless tobacco: Never Used    Alcohol use: No      Drug use: No     Available pre-op labs reviewed.   Lab Results   Component Value Date    WBC

## 2019-07-29 NOTE — ED PROVIDER NOTES
Patient Seen in: [facility] Emergency Department    History   Patient presents with:  Abdomen/Flank Pain (GI/)    Stated Complaint: Pain in stomach, umbilicus region.      HPI    26-year-old male with a history of diabetes and hyperlipidemia presen Exam     ED Triage Vitals [07/29/19 0741]   /87   Pulse 69   Resp 16   Temp 97.2 °F (36.2 °C)   Temp src Temporal   SpO2 96 %   O2 Device None (Room air)       Current:/88   Pulse 63   Temp 97.2 °F (36.2 °C) (Temporal)   Resp 16   Ht 180.3 cm ( CBC W/ DIFFERENTIAL          CT ABDOMEN+PELVIS(CONTRAST ONLY)(CPT=74177)   Final Result    PROCEDURE:  CT ABDOMEN+PELVIS (CONTRAST ONLY) (CPT=74177)         COMPARISON:  None. INDICATIONS:  Pain in stomach, umbilicus region.          TECHNIQUE:  C PELVIC NODES:  No adenopathy. PELVIC ORGANS:  No visible mass. Pelvic organs appropriate for patient     age. BONES:  No bony lesion or fracture.   Moderate facet joint degenerative     changes within the lower lumbar spine most pronounced at

## 2019-07-29 NOTE — H&P
Jerrod Milian is a 49year old male  Patient presents with:  Abdomen/Flank Pain (GI/)      REFERRED BY    50 y/o  male with PMH of high cholesterol and T2DM presents with constant, sharp, and 97/72 periumbilical abdominal pain since Saturday Vitamins-Minerals Oral Misc Take 1 tablet by mouth 2 (two) times daily.  [place] 3 Disp:  Rfl:      @ALL@  Family History   Problem Relation Age of Onset   • Diabetes Father          in his 35s   • Heart Attack Father 39        Effects from Diabetes 3  PSYCH: normal mood and affect  SKIN: anicteric, no rashes, no bruising  EYES: PERRLA, EOMI, sclera anicteric,  conjunctiva without pallor  HEENT: normocephalic, atraumatic, TMs clear, nares patent, mouth moist, pharynx w/o erythema  NECK: supple, no JVD Borderline 100-129 mg/dL   High     >=130mg/dL       • VLDL 02/22/2019 43* 0 - 30 mg/dL Final   • Non HDL Chol 02/22/2019 53  <130 mg/dL Final      Desirable  <130 mg/dL   Borderline  130-159 mg/dL   High        160-189 mg/dL       Very high >=190 mg/dL DIABETIC AGENTS  CHARGING COMMUNICATION  PROVIDE PATIENT EDUCATION MATERIALS  ACCUCHECK  FOLLOW UP WITH TCC  CT ABDOMEN+PELVIS(CONTRAST ONLY)(CPT=74177)  IP CONSULT TO GENERAL SURGERY  IP CONSULT TO HOSPITALIST  BED REQUEST  BED REQUEST  FULL CODE  ADMIT T

## 2019-07-29 NOTE — CONSULTS
[place] Patient Status:  Inpatient    1965 MRN [id number]   [facility] 3NW-A Attending Jaylen Wagner,    Hosp Day # 0 PCP Gal Germain MD     Reason for consult: Medical Mgt of DM     Requ (two) times daily. Disp:  Rfl:    metFORMIN HCl 850 MG Oral Tab Take 1 tablet (850 mg total) by mouth 2 (two) times daily with meals. Disp: 180 tablet Rfl: 1   atorvastatin 40 MG Oral Tab Take 1 tablet (40 mg total) by mouth daily.  Disp: 90 tablet Rfl: 1 type 2 , not on insulin  1. Pt took his oral meds this AM  2. D5 IVF  3. ISS  4. Hold all oral DM meds  3.  Obesity     Quality:  · DVT Prophylaxis: SCD  · CODE status: full  · Smith: no    Plan of care discussed with pt, pt wife, RN    Governor MD Ronan  7/

## 2019-07-29 NOTE — ED INITIAL ASSESSMENT (HPI)
Pt to ED with pain to his umbilicus. Pt states over the past year this are has been protruding but since Saturday has been red in color.

## 2019-07-29 NOTE — ANESTHESIA POSTPROCEDURE EVALUATION
[facility] Patient Status:  Inpatient   Age/Gender 49year old male MRN [id number]   [facility] SURGERY Attending Martines Manual, [address] [facility] Day # 0 PCP Odalys Foreman MD       Anesthesia Post-op Note    Procedure(s):

## 2019-07-30 ENCOUNTER — PATIENT OUTREACH (OUTPATIENT)
Dept: CASE MANAGEMENT | Age: 54
End: 2019-07-30

## 2019-07-30 NOTE — PLAN OF CARE
NURSING DISCHARGE NOTE    Discharged via wheelchair  Accompanied by family members  Belongings sent with patient.

## 2019-07-30 NOTE — PROGRESS NOTES
All patient discharge instructions were discussed with patient and family members at bedside. All questions and concerns were addressed. IV access was removed. Prescription given to patient.  Transport ordered, pt will be monitored by Maria VANESSA until leaves un

## 2019-07-30 NOTE — OPERATIVE REPORT
[facility]    PATIENT'S NAME: Sincere Muñoz   ATTENDING PHYSICIAN: Manuela Narvaez D.O.   OPERATING PHYSICIAN: Manuela Narvaez D.O.   PATIENT ACCOUNT#:   [de-identified]    LOCATION:  [address] [place] [address]  MEDICAL RECORD #:   [id number]       DATE OF BIRTH: epigastric hernia with incarcerated contents. The contents were amputated. A Ventralex patch was then placed underneath the fascial layer to widely cover the ventral defect as well as the epigastric defect.   It was sutured at 4 points at the 12, 3, 6, an

## 2019-07-31 NOTE — PAYOR COMM NOTE
--------------  ADMISSION REVIEW     Payor: Tu Finnegan  #:  [id number]  Authorization Number: [id number]    Admit date: 7/29/19  Admit time: [address]       Admitting Physician: Lopez Orona DO  Attending Physician:  No att. providers found Former Smoker        Packs/day: 0.50        Years: 10.00        Pack years: 5        Quit date: 2012        Years since quittin.4      Smokeless tobacco: Never Used    Alcohol use: Yes      Frequency: Monthly or less      Drinks per session: 1 or 2 within normal limits   LACTIC ACID 3 HR POST POSITIVE - Normal   CBC WITH DIFFERENTIAL WITH PLATELET    Narrative: The following orders were created for panel order CBC WITH DIFFERENTIAL WITH PLATELET.   Procedure                               Abnormali calcification. ADRENALS:  No mass or enlargement. AORTA/VASCULAR:  No aneurysm or dissection. RETROPERITONEUM:  No mass or adenopathy. BOWEL/MESENTERY:  No visible mass, obstruction, or bowel wall thickening.       The appendix is normal umbilical hernia  (primary encounter diagnosis)    Disposition:  Admit  7/29/2019 10:49 am    Follow-up:  No follow-up provider specified.       Medications Prescribed:  Current Discharge Medication List              Present on Admission  Date Reviewed: 2/2 Diabetes ([facility]    • Diabetes mellitus ([facility]    • Fatigue    • Herniated disc    • High cholesterol    • Neuropathy    • Pain in joints    • Sputum production    • Tremors of nervous system     tremors all the time.  non essential tremors per neurologist   • any unusual skin rashes or jaundice  HEENT: denies nasal congestion, sinus pain or sore throat; hearing loss negative,   EYES , no diplopia or vision changes  RESPIRATORY: denies shortness of breath, wheezing or cough   CARDIOVASCULAR: denies chest pain or the formula recommended by the American Diabetes Association. eAG levels reflect the long term average glucose and may not correlate with random or fasting glucose levels since these represent specific points in time.           • Cholesterol, Total 02/22/20 105  >=60 Final   • GFR, -American 02/22/2019 121  >=60 Final   • AST 02/22/2019 18  15 - 37 U/L Final   • ALT 02/22/2019 38  16 - 61 U/L Final   • Alkaline Phosphatase 02/22/2019 57  45 - 117 U/L Final   • Bilirubin, Total 02/22/2019 0.9  0.1 - 2. 0 OPERATING PHYSICIAN: Sascha Ureña D.O.   PATIENT ACCOUNT#:   [de-identified]    LOCATION:  [address] [place] [address]  MEDICAL RECORD #:   [id number]       YOB: 1965  ADMISSION DATE:       07/29/2019      OPERATION DATE:  07/29/2019     OPERATIVE REPOR patch was then placed underneath the fascial layer to widely cover the ventral defect as well as the epigastric defect. It was sutured at 4 points at the 12, 3, 6, and 9 o'clock positions using through-and-through fascial sutures with 2-0 Ethibond suture.

## 2019-07-31 NOTE — PAYOR COMM NOTE
--------------  DISCHARGE REVIEW    Payor: Tu Finnegan  #:  [id number]  Authorization Number: [id number]    Admit date: 7/29/19  Admit time:  1232  Discharge Date: 7/29/2019  8:12 PM     Admitting Physician: DO Zach Maya

## 2019-08-12 ENCOUNTER — OFFICE VISIT (OUTPATIENT)
Dept: SURGERY | Facility: CLINIC | Age: 54
End: 2019-08-12

## 2019-08-12 ENCOUNTER — MED REC SCAN ONLY (OUTPATIENT)
Dept: SURGERY | Facility: CLINIC | Age: 54
End: 2019-08-12

## 2019-08-12 VITALS
SYSTOLIC BLOOD PRESSURE: 127 MMHG | BODY MASS INDEX: 31.64 KG/M2 | HEART RATE: 76 BPM | TEMPERATURE: 98 F | WEIGHT: 226 LBS | DIASTOLIC BLOOD PRESSURE: 83 MMHG | HEIGHT: 70.74 IN

## 2019-08-12 DIAGNOSIS — K42.0 INCARCERATED UMBILICAL HERNIA: Primary | ICD-10-CM

## 2019-08-12 PROCEDURE — 99024 POSTOP FOLLOW-UP VISIT: CPT | Performed by: PHYSICIAN ASSISTANT

## 2019-08-12 NOTE — PROGRESS NOTES
Post Operative Visit Note       Active Problems  1. Incarcerated umbilical hernia         Chief Complaint   Patient presents with:  Hernia: PO 7/29 ER ventral hernia repair, still sharp pain w/movement, no loss of appetite, normal bowel mvmnts.         Hist • Other (Pre-diabetes) Sister    • Other (Liver Disease) Brother    • Diabetes Paternal Uncle    • Diabetes Paternal Uncle    • Other (ESRD) Paternal Uncle    • Cancer Neg    • Stroke Neg      Social History    Socioeconomic History      Marital status: HENT: Negative for hearing loss, nosebleeds, sore throat and trouble swallowing. Respiratory: Negative for apnea, cough, shortness of breath and wheezing. Cardiovascular: Negative for chest pain, palpitations and leg swelling.    Gastrointestinal: N surgical procedure. Discussed with the patient that he can experience some mild muscular discomfort especially with certain movements such as bending, twisting, reaching, or pushing. The patient should use caution with these activities.     The patient

## 2019-10-14 NOTE — TELEPHONE ENCOUNTER
Passed protocol     Last refill:  Entered historically on 2/20/2019 Vascepa 1 g - 2 capsules BID    LOV:   2/22/2019 Dr Lord Russian RTC 6 months   2. Hypercholesterolemia - Stable on prescription medication. No new issues. Check labs.     Last Lipid 2/22/2019

## 2019-10-15 RX ORDER — ICOSAPENT ETHYL 1000 MG/1
2 CAPSULE ORAL 2 TIMES DAILY
Qty: 360 CAPSULE | Refills: 3 | Status: SHIPPED | OUTPATIENT
Start: 2019-10-15 | End: 2020-01-14 | Stop reason: ALTCHOICE

## 2019-11-12 DIAGNOSIS — E11.9 CONTROLLED TYPE 2 DIABETES MELLITUS WITHOUT COMPLICATION, WITHOUT LONG-TERM CURRENT USE OF INSULIN (HCC): ICD-10-CM

## 2019-11-12 DIAGNOSIS — E78.00 HYPERCHOLESTEROLEMIA: ICD-10-CM

## 2019-11-13 RX ORDER — ATORVASTATIN CALCIUM 40 MG/1
TABLET, FILM COATED ORAL
Qty: 90 TABLET | Refills: 1 | Status: SHIPPED | OUTPATIENT
Start: 2019-11-13 | End: 2020-07-20

## 2019-11-15 DIAGNOSIS — E11.9 CONTROLLED TYPE 2 DIABETES MELLITUS WITHOUT COMPLICATION, WITHOUT LONG-TERM CURRENT USE OF INSULIN (HCC): ICD-10-CM

## 2019-11-15 RX ORDER — GLIMEPIRIDE 2 MG/1
2 TABLET ORAL 2 TIMES DAILY WITH MEALS
Qty: 180 TABLET | Refills: 1 | Status: SHIPPED | OUTPATIENT
Start: 2019-11-15 | End: 2020-06-08

## 2019-11-15 NOTE — TELEPHONE ENCOUNTER
Glimepiride 2 mg 1 tab bid filled 2=22=19 180 with 1 refill     LOV 2-22-19   RTC 6 months.   Next apt 1-14-20   Labs 11-30-18   HgbA1C  <5.7 % 6.6High

## 2020-01-14 ENCOUNTER — OFFICE VISIT (OUTPATIENT)
Dept: INTERNAL MEDICINE CLINIC | Facility: CLINIC | Age: 55
End: 2020-01-14
Payer: COMMERCIAL

## 2020-01-14 VITALS
DIASTOLIC BLOOD PRESSURE: 70 MMHG | TEMPERATURE: 98 F | WEIGHT: 222 LBS | HEART RATE: 80 BPM | HEIGHT: 70.5 IN | RESPIRATION RATE: 16 BRPM | BODY MASS INDEX: 31.43 KG/M2 | SYSTOLIC BLOOD PRESSURE: 114 MMHG

## 2020-01-14 DIAGNOSIS — E78.00 HYPERCHOLESTEROLEMIA: ICD-10-CM

## 2020-01-14 DIAGNOSIS — Z12.5 SCREENING PSA (PROSTATE SPECIFIC ANTIGEN): ICD-10-CM

## 2020-01-14 DIAGNOSIS — Z13.6 SCREENING FOR HEART DISEASE: ICD-10-CM

## 2020-01-14 DIAGNOSIS — E66.09 CLASS 1 OBESITY DUE TO EXCESS CALORIES WITH SERIOUS COMORBIDITY AND BODY MASS INDEX (BMI) OF 31.0 TO 31.9 IN ADULT: ICD-10-CM

## 2020-01-14 DIAGNOSIS — E11.9 TYPE 2 DIABETES MELLITUS WITHOUT COMPLICATION, WITHOUT LONG-TERM CURRENT USE OF INSULIN (HCC): ICD-10-CM

## 2020-01-14 DIAGNOSIS — Z00.00 ROUTINE GENERAL MEDICAL EXAMINATION AT A HEALTH CARE FACILITY: Primary | ICD-10-CM

## 2020-01-14 PROBLEM — K42.0 INCARCERATED UMBILICAL HERNIA: Status: RESOLVED | Noted: 2019-07-29 | Resolved: 2020-01-14

## 2020-01-14 PROBLEM — R73.9 HYPERGLYCEMIA: Status: RESOLVED | Noted: 2019-07-29 | Resolved: 2020-01-14

## 2020-01-14 PROCEDURE — 99396 PREV VISIT EST AGE 40-64: CPT | Performed by: INTERNAL MEDICINE

## 2020-01-14 NOTE — PATIENT INSTRUCTIONS
Onofre,    1. Get labs done. 2. You and I will connect again 6 months. 3. Get Heart Scan done. 4. See your eye doctor next month and please make sure they forward me the report.   5. Here are the words from Dr. Jennifer Mireles:    Viewed by Yuliana Abel

## 2020-01-14 NOTE — PROGRESS NOTES
Patient presents with:  Physical: Non Fasting - Eye exam 2019 at [facility]      HPI: Marisa Curtis presents today for male CPX. Stable health. Due for wellness labs. Health goals continue to center around longevity, vitality, and QOL.     Review of Systems   All Frequency: Monthly or less      Drinks per session: 1 or 2      Binge frequency: Never      Comment: 1 drink / week     Drug use: No      Family History   Problem Relation Age of Onset   • Diabetes Father          in his 35s   • Heart Attack Father 39 mellitus without complication, without long-term current use of insulin (hcc)  Hypercholesterolemia  Class 1 obesity due to excess calories with serious comorbidity and body mass index (bmi) of 31.0 to 31.9 in adult    1. Male CPX - Stable health.   Check w

## 2020-01-17 ENCOUNTER — TELEPHONE (OUTPATIENT)
Dept: INTERNAL MEDICINE CLINIC | Facility: CLINIC | Age: 55
End: 2020-01-17

## 2020-02-07 ENCOUNTER — TELEPHONE (OUTPATIENT)
Dept: INTERNAL MEDICINE CLINIC | Facility: CLINIC | Age: 55
End: 2020-02-07

## 2020-02-07 NOTE — TELEPHONE ENCOUNTER
Patient called in with [facility] on the line as well, scheduled for blood to be drawn. [facility] stated the Vitamin D is not covered under pt's insurance and was seeking another diagnoses code. Per Nurse Miladis Turner, we will need to check with Dr Rosemarie Blue., and to proceed with all other labs and skip the vitamin D for now, until further notice.

## 2020-02-07 NOTE — TELEPHONE ENCOUNTER
Cancel. Ruth Garcia MD  Diplomate, American Board of Internal Medicine  Member, [employer] of [address] [facility]  [address] [facility],4Th Floor, [address], [facility], [address] [place] [address]  T: [phone]; F: Kayli 5

## 2020-02-08 LAB
ABSOLUTE BASOPHILS: 33 CELLS/UL (ref 0–200)
ABSOLUTE EOSINOPHILS: 150 CELLS/UL (ref 15–500)
ABSOLUTE LYMPHOCYTES: 2444 CELLS/UL (ref 850–3900)
ABSOLUTE MONOCYTES: 507 CELLS/UL (ref 200–950)
ABSOLUTE NEUTROPHILS: 3367 CELLS/UL (ref 1500–7800)
ALBUMIN/GLOBULIN RATIO: 2 (CALC) (ref 1–2.5)
ALBUMIN: 4.3 G/DL (ref 3.6–5.1)
ALKALINE PHOSPHATASE: 59 U/L (ref 35–144)
ALT: 25 U/L (ref 9–46)
APPEARANCE: CLEAR
AST: 17 U/L (ref 10–35)
BASOPHILS: 0.5 %
BILIRUBIN, TOTAL: 1.1 MG/DL (ref 0.2–1.2)
BILIRUBIN: NEGATIVE
BUN/CREATININE RATIO: 22 (CALC) (ref 6–22)
BUN: 15 MG/DL (ref 7–25)
CALCIUM: 9.1 MG/DL (ref 8.6–10.3)
CARBON DIOXIDE: 21 MMOL/L (ref 20–32)
CHLORIDE: 107 MMOL/L (ref 98–110)
CHOL/HDLC RATIO: 2 (CALC)
CHOLESTEROL, TOTAL: 65 MG/DL
COLOR: YELLOW
CREATININE, RANDOM URINE: 182 MG/DL (ref 20–320)
CREATININE: 0.67 MG/DL (ref 0.7–1.33)
EGFR IF AFRICN AM: 127 ML/MIN/1.73M2
EGFR IF NONAFRICN AM: 110 ML/MIN/1.73M2
EOSINOPHILS: 2.3 %
GLOBULIN: 2.2 G/DL (CALC) (ref 1.9–3.7)
GLUCOSE: 144 MG/DL (ref 65–99)
GLUCOSE: NEGATIVE
HDL CHOLESTEROL: 33 MG/DL
HEMATOCRIT: 45.3 % (ref 38.5–50)
HEMOGLOBIN A1C: 8.6 % OF TOTAL HGB
HEMOGLOBIN: 15.8 G/DL (ref 13.2–17.1)
KETONES: NEGATIVE
LDL-CHOLESTEROL: 11 MG/DL (CALC)
LEUKOCYTE ESTERASE: NEGATIVE
LYMPHOCYTES: 37.6 %
MCH: 31.8 PG (ref 27–33)
MCHC: 34.9 G/DL (ref 32–36)
MCV: 91.1 FL (ref 80–100)
MICROALBUMIN/CREATININE RATIO, RANDOM URINE: 32 MCG/MG CREAT
MICROALBUMIN: 5.8 MG/DL
MONOCYTES: 7.8 %
MPV: 10.5 FL (ref 7.5–12.5)
NEUTROPHILS: 51.8 %
NITRITE: NEGATIVE
NON-HDL CHOLESTEROL: 32 MG/DL (CALC)
OCCULT BLOOD: NEGATIVE
PH: 5.5 (ref 5–8)
PLATELET COUNT: 195 THOUSAND/UL (ref 140–400)
POTASSIUM: 3.9 MMOL/L (ref 3.5–5.3)
PROTEIN, TOTAL: 6.5 G/DL (ref 6.1–8.1)
PSA, TOTAL: 0.3 NG/ML
RDW: 12.4 % (ref 11–15)
RED BLOOD CELL COUNT: 4.97 MILLION/UL (ref 4.2–5.8)
SODIUM: 140 MMOL/L (ref 135–146)
SPECIFIC GRAVITY: 1.03 (ref 1–1.03)
TRIGLYCERIDES: 128 MG/DL
TSH W/REFLEX TO FT4: 1.08 MIU/L (ref 0.4–4.5)
WHITE BLOOD CELL COUNT: 6.5 THOUSAND/UL (ref 3.8–10.8)

## 2020-02-13 ENCOUNTER — TELEPHONE (OUTPATIENT)
Dept: INTERNAL MEDICINE CLINIC | Facility: CLINIC | Age: 55
End: 2020-02-13

## 2020-02-13 NOTE — TELEPHONE ENCOUNTER
Called to inform test result from 2/7/20 and pt's wife stated unable to f/u w Dr Troy Rios as pcp as insurance does not provide coverage to continue seeing Dr Troy Rios as pcp.  Advised pt's wife to call insurance and tried to get a sort of waiver if possible, othe

## 2020-02-14 NOTE — TELEPHONE ENCOUNTER
Xu Walker MD  Diplomate, American Board of Internal Medicine  Member, American College of [address] [facility]  [address] [facility],4Th Floor, [address], [facility], [address] [place] [address]  T: [phone]; F: Kayli 5

## 2020-03-12 RX ORDER — BLOOD SUGAR DIAGNOSTIC
STRIP MISCELLANEOUS
Qty: 200 STRIP | Refills: 2 | Status: SHIPPED | OUTPATIENT
Start: 2020-03-12

## 2020-03-12 NOTE — TELEPHONE ENCOUNTER
accu-check test strips besting bid filled 2-22-19 200 strips with 2 refills     LOV 1-14-20   RTC 6 months. Next apt 7-20-20   Labs 2-7-20   HEMOGLOBIN A1c  <5.7 % of total Hgb 8. Camryn Lopez

## 2020-05-20 DIAGNOSIS — E11.9 CONTROLLED TYPE 2 DIABETES MELLITUS WITHOUT COMPLICATION, WITHOUT LONG-TERM CURRENT USE OF INSULIN (HCC): ICD-10-CM

## 2020-05-21 NOTE — TELEPHONE ENCOUNTER
Metformin 850 mg 1 tab bid filled 11-14-19 180 with 1 refill     LOV 1-14-20   . RTC 6 months  Next apt 7-20-20   Labs 2-7-20   HEMOGLOBIN A1c  <5.7 % of total Hgb 8. Marleni Endo

## 2020-06-05 DIAGNOSIS — E11.9 CONTROLLED TYPE 2 DIABETES MELLITUS WITHOUT COMPLICATION, WITHOUT LONG-TERM CURRENT USE OF INSULIN (HCC): ICD-10-CM

## 2020-06-08 RX ORDER — GLIMEPIRIDE 2 MG/1
2 TABLET ORAL 2 TIMES DAILY WITH MEALS
Qty: 180 TABLET | Refills: 0 | Status: SHIPPED | OUTPATIENT
Start: 2020-06-08 | End: 2020-07-20

## 2020-06-08 NOTE — TELEPHONE ENCOUNTER
Glimepiride 2 mg failed protocol due to   Diabetic Medication Protocol Failed6/5 12:32 AM   Last HgBA1C < 7.5   Last OV relevant to medication: 1-14-20  Last refill date: 11-15-19 #/refills: 1  When pt was asked to return for OV: 6 mo.   Upcoming appt/reaso

## 2020-07-20 ENCOUNTER — OFFICE VISIT (OUTPATIENT)
Dept: INTERNAL MEDICINE CLINIC | Facility: CLINIC | Age: 55
End: 2020-07-20
Payer: COMMERCIAL

## 2020-07-20 VITALS
TEMPERATURE: 98 F | DIASTOLIC BLOOD PRESSURE: 82 MMHG | WEIGHT: 220 LBS | SYSTOLIC BLOOD PRESSURE: 116 MMHG | HEART RATE: 88 BPM | HEIGHT: 70.5 IN | OXYGEN SATURATION: 97 % | BODY MASS INDEX: 31.14 KG/M2 | RESPIRATION RATE: 16 BRPM

## 2020-07-20 DIAGNOSIS — E66.09 CLASS 1 OBESITY DUE TO EXCESS CALORIES WITH SERIOUS COMORBIDITY AND BODY MASS INDEX (BMI) OF 31.0 TO 31.9 IN ADULT: ICD-10-CM

## 2020-07-20 DIAGNOSIS — E11.9 CONTROLLED TYPE 2 DIABETES MELLITUS WITHOUT COMPLICATION, WITHOUT LONG-TERM CURRENT USE OF INSULIN (HCC): Primary | ICD-10-CM

## 2020-07-20 DIAGNOSIS — E78.00 HYPERCHOLESTEROLEMIA: ICD-10-CM

## 2020-07-20 PROBLEM — E11.65 UNCONTROLLED TYPE 2 DIABETES MELLITUS WITH HYPERGLYCEMIA, WITHOUT LONG-TERM CURRENT USE OF INSULIN (HCC): Status: ACTIVE | Noted: 2020-07-20

## 2020-07-20 LAB — CARTRIDGE LOT#: ABNORMAL NUMERIC

## 2020-07-20 PROCEDURE — 99214 OFFICE O/P EST MOD 30 MIN: CPT | Performed by: INTERNAL MEDICINE

## 2020-07-20 PROCEDURE — 83036 HEMOGLOBIN GLYCOSYLATED A1C: CPT | Performed by: INTERNAL MEDICINE

## 2020-07-20 PROCEDURE — 3079F DIAST BP 80-89 MM HG: CPT | Performed by: INTERNAL MEDICINE

## 2020-07-20 PROCEDURE — 3074F SYST BP LT 130 MM HG: CPT | Performed by: INTERNAL MEDICINE

## 2020-07-20 PROCEDURE — 3008F BODY MASS INDEX DOCD: CPT | Performed by: INTERNAL MEDICINE

## 2020-07-20 RX ORDER — ICOSAPENT ETHYL 1000 MG/1
2 CAPSULE ORAL 2 TIMES DAILY
Qty: 360 CAPSULE | Refills: 1 | Status: SHIPPED | OUTPATIENT
Start: 2020-07-20 | End: 2021-02-18

## 2020-07-20 RX ORDER — GLIMEPIRIDE 2 MG/1
2 TABLET ORAL 2 TIMES DAILY WITH MEALS
Qty: 180 TABLET | Refills: 0 | Status: SHIPPED | OUTPATIENT
Start: 2020-07-20 | End: 2021-01-22

## 2020-07-20 RX ORDER — ATORVASTATIN CALCIUM 40 MG/1
40 TABLET, FILM COATED ORAL DAILY
Qty: 90 TABLET | Refills: 1 | Status: SHIPPED | OUTPATIENT
Start: 2020-07-20 | End: 2021-01-22

## 2020-07-20 NOTE — PROGRESS NOTES
Patient presents with:  Diabetes      HPI: Tyshawn James presents today for 6-month f/u diabetes but also for hypercholesterolemia and Class 1 obesity as follows:  1. Uncontrolled DM2 - Due for updated A1c. Remains on prescription medication as scheduled.  Oracio session: 1 or 2      Binge frequency: Never      Comment: occ    Drug use: No      PE:  /82 (BP Location: Left arm, Patient Position: Sitting, Cuff Size: large)   Pulse 88   Temp 98.3 °F (36.8 °C) (Oral)   Resp 16   Ht 70.5\"   Wt 220 lb (99.8 kg) MD  Diplomate, American Board of Internal Medicine  Member, [facility]  [address] [facility],4Th Floor, [address], [facility], [address] [place] [address]  T: [id number]; F: [phone]     Orders Placed This Encounter      Hgb A1C

## 2020-10-27 DIAGNOSIS — E11.9 CONTROLLED TYPE 2 DIABETES MELLITUS WITHOUT COMPLICATION, WITHOUT LONG-TERM CURRENT USE OF INSULIN (HCC): ICD-10-CM

## 2020-10-28 NOTE — TELEPHONE ENCOUNTER
Metformin 850 mg 1 tab bid filled 7-20-20 180 with 0 refills     LOV 7-20-20   RTC 6 months for f/u.   Next apt 1-12-21  Labs 7-20-20   HEMOGLOBIN A1C   4.3 - 5.6 % 6.6 %Abnormal

## 2021-01-09 ENCOUNTER — TELEPHONE (OUTPATIENT)
Dept: INTERNAL MEDICINE CLINIC | Facility: CLINIC | Age: 56
End: 2021-01-09

## 2021-01-11 ENCOUNTER — TELEPHONE (OUTPATIENT)
Dept: INTERNAL MEDICINE CLINIC | Facility: CLINIC | Age: 56
End: 2021-01-11

## 2021-01-11 DIAGNOSIS — E11.65 UNCONTROLLED TYPE 2 DIABETES MELLITUS WITH HYPERGLYCEMIA, WITHOUT LONG-TERM CURRENT USE OF INSULIN (HCC): ICD-10-CM

## 2021-01-11 DIAGNOSIS — E78.00 HYPERCHOLESTEROLEMIA: ICD-10-CM

## 2021-01-11 DIAGNOSIS — Z12.5 SCREENING FOR MALIGNANT NEOPLASM OF PROSTATE: ICD-10-CM

## 2021-01-11 DIAGNOSIS — Z00.00 ENCOUNTER FOR PREVENTATIVE ADULT HEALTH CARE EXAMINATION: Primary | ICD-10-CM

## 2021-01-11 NOTE — TELEPHONE ENCOUNTER
Pt has FOV scheduled for CPX. Did you want pt to complete any lab work prior to appt? [facility]ses [facility].      Your appointments     Date & Time Appointment Department [facility])    Jan 22, 2021 10:00 AM CST Physical - Established with MD Tobin Matthews

## 2021-01-16 ENCOUNTER — LAB ENCOUNTER (OUTPATIENT)
Dept: LAB | Facility: HOSPITAL | Age: 56
End: 2021-01-16
Attending: INTERNAL MEDICINE
Payer: COMMERCIAL

## 2021-01-16 DIAGNOSIS — Z12.5 SCREENING FOR MALIGNANT NEOPLASM OF PROSTATE: ICD-10-CM

## 2021-01-16 DIAGNOSIS — E78.00 HYPERCHOLESTEROLEMIA: ICD-10-CM

## 2021-01-16 DIAGNOSIS — Z00.00 ENCOUNTER FOR PREVENTATIVE ADULT HEALTH CARE EXAMINATION: ICD-10-CM

## 2021-01-16 DIAGNOSIS — E11.65 UNCONTROLLED TYPE 2 DIABETES MELLITUS WITH HYPERGLYCEMIA, WITHOUT LONG-TERM CURRENT USE OF INSULIN (HCC): ICD-10-CM

## 2021-01-16 LAB
ALBUMIN SERPL-MCNC: 3.9 G/DL (ref 3.4–5)
ALBUMIN/GLOB SERPL: 1.2 {RATIO} (ref 1–2)
ALP LIVER SERPL-CCNC: 66 U/L
ALT SERPL-CCNC: 28 U/L
ANION GAP SERPL CALC-SCNC: 6 MMOL/L (ref 0–18)
AST SERPL-CCNC: 14 U/L (ref 15–37)
BASOPHILS # BLD AUTO: 0.03 X10(3) UL (ref 0–0.2)
BASOPHILS NFR BLD AUTO: 0.4 %
BILIRUB SERPL-MCNC: 1 MG/DL (ref 0.1–2)
BUN BLD-MCNC: 15 MG/DL (ref 7–18)
BUN/CREAT SERPL: 19.5 (ref 10–20)
CALCIUM BLD-MCNC: 8.4 MG/DL (ref 8.5–10.1)
CHLORIDE SERPL-SCNC: 106 MMOL/L (ref 98–112)
CHOLEST SMN-MCNC: 85 MG/DL (ref ?–200)
CO2 SERPL-SCNC: 26 MMOL/L (ref 21–32)
COMPLEXED PSA SERPL-MCNC: 0.28 NG/ML (ref ?–4)
CREAT BLD-MCNC: 0.77 MG/DL
CREAT UR-SCNC: 379 MG/DL
DEPRECATED RDW RBC AUTO: 39 FL (ref 35.1–46.3)
EOSINOPHIL # BLD AUTO: 0.12 X10(3) UL (ref 0–0.7)
EOSINOPHIL NFR BLD AUTO: 1.6 %
ERYTHROCYTE [DISTWIDTH] IN BLOOD BY AUTOMATED COUNT: 11.9 % (ref 11–15)
EST. AVERAGE GLUCOSE BLD GHB EST-MCNC: 306 MG/DL (ref 68–126)
GLOBULIN PLAS-MCNC: 3.2 G/DL (ref 2.8–4.4)
GLUCOSE BLD-MCNC: 183 MG/DL (ref 70–99)
HBA1C MFR BLD HPLC: 12.3 % (ref ?–5.7)
HCT VFR BLD AUTO: 50.1 %
HDLC SERPL-MCNC: 33 MG/DL (ref 40–59)
HGB BLD-MCNC: 17.1 G/DL
IMM GRANULOCYTES # BLD AUTO: 0.03 X10(3) UL (ref 0–1)
IMM GRANULOCYTES NFR BLD: 0.4 %
LDLC SERPL CALC-MCNC: 1 MG/DL (ref ?–100)
LYMPHOCYTES # BLD AUTO: 2.75 X10(3) UL (ref 1–4)
LYMPHOCYTES NFR BLD AUTO: 36.5 %
M PROTEIN MFR SERPL ELPH: 7.1 G/DL (ref 6.4–8.2)
MCH RBC QN AUTO: 30.6 PG (ref 26–34)
MCHC RBC AUTO-ENTMCNC: 34.1 G/DL (ref 31–37)
MCV RBC AUTO: 89.8 FL
MICROALBUMIN UR-MCNC: 15.4 MG/DL
MICROALBUMIN/CREAT 24H UR-RTO: 40.6 UG/MG (ref ?–30)
MONOCYTES # BLD AUTO: 0.64 X10(3) UL (ref 0.1–1)
MONOCYTES NFR BLD AUTO: 8.5 %
NEUTROPHILS # BLD AUTO: 3.96 X10 (3) UL (ref 1.5–7.7)
NEUTROPHILS # BLD AUTO: 3.96 X10(3) UL (ref 1.5–7.7)
NEUTROPHILS NFR BLD AUTO: 52.6 %
NONHDLC SERPL-MCNC: 52 MG/DL (ref ?–130)
OSMOLALITY SERPL CALC.SUM OF ELEC: 292 MOSM/KG (ref 275–295)
PATIENT FASTING Y/N/NP: YES
PATIENT FASTING Y/N/NP: YES
PLATELET # BLD AUTO: 180 10(3)UL (ref 150–450)
POTASSIUM SERPL-SCNC: 3.5 MMOL/L (ref 3.5–5.1)
RBC # BLD AUTO: 5.58 X10(6)UL
SODIUM SERPL-SCNC: 138 MMOL/L (ref 136–145)
TRIGL SERPL-MCNC: 256 MG/DL (ref 30–149)
TSI SER-ACNC: 0.41 MIU/ML (ref 0.36–3.74)
VLDLC SERPL CALC-MCNC: 51 MG/DL (ref 0–30)
WBC # BLD AUTO: 7.5 X10(3) UL (ref 4–11)

## 2021-01-16 PROCEDURE — 84443 ASSAY THYROID STIM HORMONE: CPT

## 2021-01-16 PROCEDURE — 3061F NEG MICROALBUMINURIA REV: CPT | Performed by: INTERNAL MEDICINE

## 2021-01-16 PROCEDURE — 85025 COMPLETE CBC W/AUTO DIFF WBC: CPT

## 2021-01-16 PROCEDURE — 83036 HEMOGLOBIN GLYCOSYLATED A1C: CPT

## 2021-01-16 PROCEDURE — 80061 LIPID PANEL: CPT

## 2021-01-16 PROCEDURE — 82043 UR ALBUMIN QUANTITATIVE: CPT

## 2021-01-16 PROCEDURE — 82570 ASSAY OF URINE CREATININE: CPT

## 2021-01-16 PROCEDURE — 80053 COMPREHEN METABOLIC PANEL: CPT

## 2021-01-16 PROCEDURE — 36415 COLL VENOUS BLD VENIPUNCTURE: CPT

## 2021-01-16 PROCEDURE — 3060F POS MICROALBUMINURIA REV: CPT | Performed by: INTERNAL MEDICINE

## 2021-01-22 ENCOUNTER — OFFICE VISIT (OUTPATIENT)
Dept: INTERNAL MEDICINE CLINIC | Facility: CLINIC | Age: 56
End: 2021-01-22
Payer: COMMERCIAL

## 2021-01-22 VITALS
BODY MASS INDEX: 28 KG/M2 | HEART RATE: 82 BPM | SYSTOLIC BLOOD PRESSURE: 110 MMHG | TEMPERATURE: 98 F | RESPIRATION RATE: 16 BRPM | WEIGHT: 200 LBS | DIASTOLIC BLOOD PRESSURE: 70 MMHG | OXYGEN SATURATION: 97 % | HEIGHT: 70.75 IN

## 2021-01-22 DIAGNOSIS — E11.65 UNCONTROLLED TYPE 2 DIABETES MELLITUS WITH HYPERGLYCEMIA, WITHOUT LONG-TERM CURRENT USE OF INSULIN (HCC): Chronic | ICD-10-CM

## 2021-01-22 DIAGNOSIS — Z00.00 ENCOUNTER FOR PREVENTATIVE ADULT HEALTH CARE EXAMINATION: Primary | ICD-10-CM

## 2021-01-22 DIAGNOSIS — E78.00 HYPERCHOLESTEROLEMIA: ICD-10-CM

## 2021-01-22 DIAGNOSIS — Z12.11 SCREENING FOR MALIGNANT NEOPLASM OF COLON: ICD-10-CM

## 2021-01-22 PROCEDURE — 3074F SYST BP LT 130 MM HG: CPT | Performed by: INTERNAL MEDICINE

## 2021-01-22 PROCEDURE — 3008F BODY MASS INDEX DOCD: CPT | Performed by: INTERNAL MEDICINE

## 2021-01-22 PROCEDURE — 3078F DIAST BP <80 MM HG: CPT | Performed by: INTERNAL MEDICINE

## 2021-01-22 PROCEDURE — 99396 PREV VISIT EST AGE 40-64: CPT | Performed by: INTERNAL MEDICINE

## 2021-01-22 RX ORDER — ATORVASTATIN CALCIUM 20 MG/1
20 TABLET, FILM COATED ORAL NIGHTLY
Qty: 90 TABLET | Refills: 1 | Status: SHIPPED | OUTPATIENT
Start: 2021-01-22 | End: 2021-02-18

## 2021-01-22 RX ORDER — ATORVASTATIN CALCIUM 40 MG/1
40 TABLET, FILM COATED ORAL DAILY
Qty: 90 TABLET | Refills: 1 | Status: CANCELLED | OUTPATIENT
Start: 2021-01-22

## 2021-01-22 RX ORDER — GLIMEPIRIDE 2 MG/1
2 TABLET ORAL 2 TIMES DAILY WITH MEALS
Qty: 180 TABLET | Refills: 1 | Status: SHIPPED | OUTPATIENT
Start: 2021-01-22 | End: 2021-02-18

## 2021-01-22 NOTE — PROGRESS NOTES
July Booth is a 48year old male. HPI:   Patient presents with:  Establish Care: Previously seeing Dr Maria Guadalupe Kennedy; Diabetic Eye Exam [facility] in [place] in 2020  Physical    Patient presents for CPX/wellness examination.   Diet: Has been sub-optimal over the Medical:  has a past medical history of Back pain, Back problem, Belching, Diabetes ([facility] [address] [facility] Fatigue, Herniated disc, High cholesterol, Neuropathy, Pain in joints, Sputum production, Tremors of nervous system, Type 2 diabetes sara joint pain  PSYCH: pleasant, appropriate mood and affect  ASSESSMENT AND PLAN:   1. Encounter for preventative adult health care examination  2. Screening for malignant neoplasm of colon  Age appropriate health guidance and counseling provided.   Screening

## 2021-01-22 NOTE — PATIENT INSTRUCTIONS
- Diabetes is worse; A1c went from 6.6% to 12.3% (average blood sugar in the 300's). - Focus on healthy diet and regular exercise  - We will continue metformin and glimepiride at current doses for now.   If A1c is still high at next visit, we will likely i

## 2021-02-15 DIAGNOSIS — E11.65 UNCONTROLLED TYPE 2 DIABETES MELLITUS WITH HYPERGLYCEMIA, WITHOUT LONG-TERM CURRENT USE OF INSULIN (HCC): Chronic | ICD-10-CM

## 2021-02-15 DIAGNOSIS — E78.00 HYPERCHOLESTEROLEMIA: ICD-10-CM

## 2021-02-15 NOTE — TELEPHONE ENCOUNTER
Received fax request from mail order. Attempted to contact pt to verify if he uses mail order as not on his pharmacy list.     [facility] is [facility].

## 2021-02-18 RX ORDER — ICOSAPENT ETHYL 1000 MG/1
2 CAPSULE ORAL 2 TIMES DAILY
Qty: 360 CAPSULE | Refills: 1 | Status: SHIPPED | OUTPATIENT
Start: 2021-02-18

## 2021-02-18 RX ORDER — GLIMEPIRIDE 2 MG/1
2 TABLET ORAL 2 TIMES DAILY WITH MEALS
Qty: 180 TABLET | Refills: 1 | Status: SHIPPED | OUTPATIENT
Start: 2021-02-18 | End: 2021-07-30

## 2021-02-18 RX ORDER — ATORVASTATIN CALCIUM 20 MG/1
20 TABLET, FILM COATED ORAL NIGHTLY
Qty: 90 TABLET | Refills: 1 | Status: SHIPPED | OUTPATIENT
Start: 2021-02-18 | End: 2021-10-06

## 2021-04-15 ENCOUNTER — OFFICE VISIT (OUTPATIENT)
Dept: INTERNAL MEDICINE CLINIC | Facility: CLINIC | Age: 56
End: 2021-04-15
Payer: COMMERCIAL

## 2021-04-15 VITALS
BODY MASS INDEX: 28.67 KG/M2 | RESPIRATION RATE: 16 BRPM | OXYGEN SATURATION: 98 % | HEART RATE: 87 BPM | HEIGHT: 70.75 IN | SYSTOLIC BLOOD PRESSURE: 136 MMHG | TEMPERATURE: 98 F | WEIGHT: 204.81 LBS | DIASTOLIC BLOOD PRESSURE: 78 MMHG

## 2021-04-15 DIAGNOSIS — E66.3 OVERWEIGHT (BMI 25.0-29.9): ICD-10-CM

## 2021-04-15 DIAGNOSIS — E11.65 UNCONTROLLED TYPE 2 DIABETES MELLITUS WITH HYPERGLYCEMIA, WITHOUT LONG-TERM CURRENT USE OF INSULIN (HCC): Primary | ICD-10-CM

## 2021-04-15 DIAGNOSIS — E78.00 HYPERCHOLESTEROLEMIA: Chronic | ICD-10-CM

## 2021-04-15 PROCEDURE — 3075F SYST BP GE 130 - 139MM HG: CPT | Performed by: INTERNAL MEDICINE

## 2021-04-15 PROCEDURE — 3008F BODY MASS INDEX DOCD: CPT | Performed by: INTERNAL MEDICINE

## 2021-04-15 PROCEDURE — 99214 OFFICE O/P EST MOD 30 MIN: CPT | Performed by: INTERNAL MEDICINE

## 2021-04-15 PROCEDURE — 3078F DIAST BP <80 MM HG: CPT | Performed by: INTERNAL MEDICINE

## 2021-04-15 NOTE — PATIENT INSTRUCTIONS
- Get A1c blood test done tomorrow or later. You do not have to fast.  You can schedule a lab appointment ahead of time online through [address] [place] [address] [address] or [address] [employer], or you can call central scheduling at [phone] to schedule.   You can also walk in to the la

## 2021-04-15 NOTE — PROGRESS NOTES
Yuliana Abel is a 48year old male. HPI:   Patient presents with: Follow - Up    Patient presents for follow up on chronic medical issues. DM II - glucose readings were 300's to 400's in December. Now 200-250 range.   He has made some dietary ch cholesterol, Neuropathy, Pain in joints, Sputum production, Tremors of nervous system, Type 2 diabetes mellitus without complication, without long-term current use of insulin ([facility] [address] [facility] (1/19/2018), and Wears glasses.   Surgical:  has a past surgical history carina BID.  He had foot exam done June 2020. Mild microalbuminuria this month. Eye exam done at [facility], have not received records from them yet. On statin therapy.  - HEMOGLOBIN A1C; Future    2. Hypercholesterolemia  Elevated triglycerides in January.   On V

## 2021-06-01 ENCOUNTER — TELEPHONE (OUTPATIENT)
Dept: INTERNAL MEDICINE CLINIC | Facility: CLINIC | Age: 56
End: 2021-06-01

## 2021-06-08 ENCOUNTER — LAB ENCOUNTER (OUTPATIENT)
Dept: LAB | Age: 56
End: 2021-06-08
Attending: INTERNAL MEDICINE
Payer: COMMERCIAL

## 2021-06-08 DIAGNOSIS — Z01.818 PRE-OP TESTING: ICD-10-CM

## 2021-06-11 PROBLEM — D12.3 BENIGN NEOPLASM OF TRANSVERSE COLON: Status: ACTIVE | Noted: 2021-06-11

## 2021-06-11 PROBLEM — D12.4 BENIGN NEOPLASM OF DESCENDING COLON: Status: ACTIVE | Noted: 2021-06-11

## 2021-06-11 PROBLEM — Z86.010 HISTORY OF ADENOMATOUS POLYP OF COLON: Status: ACTIVE | Noted: 2021-06-11

## 2021-06-11 PROBLEM — Z86.0101 HISTORY OF ADENOMATOUS POLYP OF COLON: Status: ACTIVE | Noted: 2021-06-11

## 2021-07-23 ENCOUNTER — LAB ENCOUNTER (OUTPATIENT)
Dept: LAB | Age: 56
End: 2021-07-23
Attending: INTERNAL MEDICINE
Payer: COMMERCIAL

## 2021-07-23 DIAGNOSIS — E11.65 UNCONTROLLED TYPE 2 DIABETES MELLITUS WITH HYPERGLYCEMIA, WITHOUT LONG-TERM CURRENT USE OF INSULIN (HCC): ICD-10-CM

## 2021-07-23 LAB
EST. AVERAGE GLUCOSE BLD GHB EST-MCNC: 235 MG/DL (ref 68–126)
HBA1C MFR BLD HPLC: 9.8 % (ref ?–5.7)

## 2021-07-23 PROCEDURE — 3046F HEMOGLOBIN A1C LEVEL >9.0%: CPT | Performed by: INTERNAL MEDICINE

## 2021-07-23 PROCEDURE — 83036 HEMOGLOBIN GLYCOSYLATED A1C: CPT | Performed by: INTERNAL MEDICINE

## 2021-07-30 ENCOUNTER — OFFICE VISIT (OUTPATIENT)
Dept: INTERNAL MEDICINE CLINIC | Facility: CLINIC | Age: 56
End: 2021-07-30
Payer: COMMERCIAL

## 2021-07-30 VITALS
OXYGEN SATURATION: 97 % | HEART RATE: 96 BPM | WEIGHT: 211 LBS | RESPIRATION RATE: 18 BRPM | TEMPERATURE: 98 F | BODY MASS INDEX: 29.54 KG/M2 | DIASTOLIC BLOOD PRESSURE: 70 MMHG | HEIGHT: 70.75 IN | SYSTOLIC BLOOD PRESSURE: 108 MMHG

## 2021-07-30 DIAGNOSIS — E66.3 OVERWEIGHT (BMI 25.0-29.9): ICD-10-CM

## 2021-07-30 DIAGNOSIS — E11.65 UNCONTROLLED TYPE 2 DIABETES MELLITUS WITH HYPERGLYCEMIA, WITHOUT LONG-TERM CURRENT USE OF INSULIN (HCC): Primary | ICD-10-CM

## 2021-07-30 DIAGNOSIS — E78.00 HYPERCHOLESTEROLEMIA: ICD-10-CM

## 2021-07-30 PROCEDURE — 99214 OFFICE O/P EST MOD 30 MIN: CPT | Performed by: INTERNAL MEDICINE

## 2021-07-30 PROCEDURE — 3078F DIAST BP <80 MM HG: CPT | Performed by: INTERNAL MEDICINE

## 2021-07-30 PROCEDURE — 3074F SYST BP LT 130 MM HG: CPT | Performed by: INTERNAL MEDICINE

## 2021-07-30 PROCEDURE — 3008F BODY MASS INDEX DOCD: CPT | Performed by: INTERNAL MEDICINE

## 2021-07-30 RX ORDER — GLIMEPIRIDE 4 MG/1
4 TABLET ORAL 2 TIMES DAILY
Qty: 180 TABLET | Refills: 1 | Status: SHIPPED | OUTPATIENT
Start: 2021-07-30 | End: 2022-02-07

## 2021-07-30 NOTE — PATIENT INSTRUCTIONS
- A1c is a little better but still higher than we would like  - I would like you to start a new medication, Jardiance. Take 1 tablet daily. Take instead of glimepiride.   - If this is not covered by insurance/expensive, fill the prescription for the highe

## 2021-07-30 NOTE — PROGRESS NOTES
Troy Platt is a 55year old male. HPI:   Patient presents with:  Lab Results: A1C    Patient presents for follow up on chronic medical issues. DM II - blood glucose readings a little better, still above goal however.     Hypercholesterolemia - o Fatigue, Frequent urination, Herniated disc, High cholesterol, Neuropathy, Pain in joints, Polyp of colon (12/6/2018), Sputum production, Tremors of nervous system, Type 2 diabetes mellitus without complication, without long-term current use of insulin ([facility] well.  MS: Full ROM, no joint pain  PSYCH: pleasant, appropriate mood and affect  ASSESSMENT AND PLAN:   1.  Uncontrolled type 2 diabetes mellitus with hyperglycemia, without long-term current use of insulin (HCC)  A1c a little better but still well above g

## 2021-07-31 PROBLEM — K63.5 POLYP OF COLON: Status: RESOLVED | Noted: 2018-12-06 | Resolved: 2021-07-31

## 2021-07-31 PROBLEM — D12.4 BENIGN NEOPLASM OF DESCENDING COLON: Status: RESOLVED | Noted: 2021-06-11 | Resolved: 2021-07-31

## 2021-07-31 PROBLEM — D12.3 BENIGN NEOPLASM OF TRANSVERSE COLON: Status: RESOLVED | Noted: 2021-06-11 | Resolved: 2021-07-31

## 2021-08-04 DIAGNOSIS — E11.65 UNCONTROLLED TYPE 2 DIABETES MELLITUS WITH HYPERGLYCEMIA, WITHOUT LONG-TERM CURRENT USE OF INSULIN (HCC): Chronic | ICD-10-CM

## 2021-08-04 RX ORDER — GLIMEPIRIDE 2 MG/1
TABLET ORAL
Qty: 180 TABLET | Refills: 1 | OUTPATIENT
Start: 2021-08-04

## 2021-09-27 NOTE — TELEPHONE ENCOUNTER
Last OV: 2/22/19 with Dr. Camacho Chaparro  When pt was asked to return for OV: 6 months  Upcoming appt/reason: no upcoming appt  Last labs 2/22/19    See TE 2/22/19~ Invokana not covered under patient's insurance.
ED

## 2021-10-06 DIAGNOSIS — E78.00 HYPERCHOLESTEROLEMIA: ICD-10-CM

## 2021-10-06 DIAGNOSIS — E11.65 UNCONTROLLED TYPE 2 DIABETES MELLITUS WITH HYPERGLYCEMIA, WITHOUT LONG-TERM CURRENT USE OF INSULIN (HCC): Chronic | ICD-10-CM

## 2021-10-06 RX ORDER — ATORVASTATIN CALCIUM 20 MG/1
TABLET, FILM COATED ORAL
Qty: 90 TABLET | Refills: 1 | Status: SHIPPED | OUTPATIENT
Start: 2021-10-06

## 2021-10-06 NOTE — TELEPHONE ENCOUNTER
Passed protocol - Atorvastatin   Failed protocol - Metformin     Last refill:    metFORMIN HCl 850 MG Oral Tab 180 tablet 1 2/18/2021    Sig:   Take 1 tablet (850 mg total) by mouth 2 (two) times daily with meals.        atorvastatin 20 MG Oral Tab 90 table

## 2021-12-28 DIAGNOSIS — E11.65 UNCONTROLLED TYPE 2 DIABETES MELLITUS WITH HYPERGLYCEMIA, WITHOUT LONG-TERM CURRENT USE OF INSULIN (HCC): Chronic | ICD-10-CM

## 2021-12-28 NOTE — TELEPHONE ENCOUNTER
Failed protocol     Last refill:  metFORMIN 850 MG Oral Tab 180 tablet 0 10/6/2021    Sig:   Take 1 tablet (850 mg total) by mouth 2 (two) times daily with meals. LOV:   07/30/21 Dr Jeannine Miller RTBRANT 3 months  1.  Uncontrolled type 2 diabetes mellitus with

## 2022-02-07 RX ORDER — GLIMEPIRIDE 4 MG/1
TABLET ORAL
Qty: 180 TABLET | Refills: 1 | Status: SHIPPED | OUTPATIENT
Start: 2022-02-07

## 2022-02-09 NOTE — TELEPHONE ENCOUNTER
Pt is taking Glimepiride and Jardiance and discontinued Metformin per DocSend. Ok to continue on these to medications for diabetes? Will send him my chart message.

## 2022-03-26 ENCOUNTER — LAB ENCOUNTER (OUTPATIENT)
Dept: LAB | Age: 57
End: 2022-03-26
Attending: INTERNAL MEDICINE
Payer: COMMERCIAL

## 2022-03-26 DIAGNOSIS — E78.00 HYPERCHOLESTEROLEMIA: ICD-10-CM

## 2022-03-26 DIAGNOSIS — E11.65 UNCONTROLLED TYPE 2 DIABETES MELLITUS WITH HYPERGLYCEMIA, WITHOUT LONG-TERM CURRENT USE OF INSULIN (HCC): ICD-10-CM

## 2022-03-26 LAB
ANION GAP SERPL CALC-SCNC: 9 MMOL/L (ref 0–18)
BUN BLD-MCNC: 17 MG/DL (ref 7–18)
CALCIUM BLD-MCNC: 9.1 MG/DL (ref 8.5–10.1)
CHLORIDE SERPL-SCNC: 106 MMOL/L (ref 98–112)
CHOLEST SERPL-MCNC: 115 MG/DL (ref ?–200)
CO2 SERPL-SCNC: 26 MMOL/L (ref 21–32)
CREAT BLD-MCNC: 0.78 MG/DL
EST. AVERAGE GLUCOSE BLD GHB EST-MCNC: 214 MG/DL (ref 68–126)
FASTING PATIENT LIPID ANSWER: YES
FASTING STATUS PATIENT QL REPORTED: YES
GLUCOSE BLD-MCNC: 200 MG/DL (ref 70–99)
HBA1C MFR BLD: 9.1 % (ref ?–5.7)
HDLC SERPL-MCNC: 33 MG/DL (ref 40–59)
LDLC SERPL CALC-MCNC: 26 MG/DL (ref ?–100)
NONHDLC SERPL-MCNC: 82 MG/DL (ref ?–130)
OSMOLALITY SERPL CALC.SUM OF ELEC: 299 MOSM/KG (ref 275–295)
POTASSIUM SERPL-SCNC: 4.1 MMOL/L (ref 3.5–5.1)
SODIUM SERPL-SCNC: 141 MMOL/L (ref 136–145)
TRIGL SERPL-MCNC: 401 MG/DL (ref 30–149)
VLDLC SERPL CALC-MCNC: 52 MG/DL (ref 0–30)

## 2022-03-26 PROCEDURE — 80061 LIPID PANEL: CPT | Performed by: INTERNAL MEDICINE

## 2022-03-26 PROCEDURE — 80048 BASIC METABOLIC PNL TOTAL CA: CPT | Performed by: INTERNAL MEDICINE

## 2022-03-26 PROCEDURE — 83036 HEMOGLOBIN GLYCOSYLATED A1C: CPT | Performed by: INTERNAL MEDICINE

## 2022-03-26 PROCEDURE — 3046F HEMOGLOBIN A1C LEVEL >9.0%: CPT | Performed by: INTERNAL MEDICINE

## 2022-03-30 ENCOUNTER — OFFICE VISIT (OUTPATIENT)
Dept: INTERNAL MEDICINE CLINIC | Facility: CLINIC | Age: 57
End: 2022-03-30
Payer: COMMERCIAL

## 2022-03-30 VITALS
HEART RATE: 97 BPM | WEIGHT: 212.38 LBS | RESPIRATION RATE: 20 BRPM | DIASTOLIC BLOOD PRESSURE: 84 MMHG | TEMPERATURE: 98 F | SYSTOLIC BLOOD PRESSURE: 138 MMHG | HEIGHT: 70.75 IN | BODY MASS INDEX: 29.73 KG/M2 | OXYGEN SATURATION: 97 %

## 2022-03-30 DIAGNOSIS — E66.3 OVERWEIGHT (BMI 25.0-29.9): ICD-10-CM

## 2022-03-30 DIAGNOSIS — E11.65 UNCONTROLLED TYPE 2 DIABETES MELLITUS WITH HYPERGLYCEMIA, WITHOUT LONG-TERM CURRENT USE OF INSULIN (HCC): Primary | ICD-10-CM

## 2022-03-30 DIAGNOSIS — Z00.00 PREVENTATIVE HEALTH CARE: ICD-10-CM

## 2022-03-30 DIAGNOSIS — Z12.5 SCREENING FOR MALIGNANT NEOPLASM OF PROSTATE: ICD-10-CM

## 2022-03-30 DIAGNOSIS — E78.00 HYPERCHOLESTEROLEMIA: ICD-10-CM

## 2022-03-30 PROCEDURE — 3075F SYST BP GE 130 - 139MM HG: CPT | Performed by: INTERNAL MEDICINE

## 2022-03-30 PROCEDURE — 3079F DIAST BP 80-89 MM HG: CPT | Performed by: INTERNAL MEDICINE

## 2022-03-30 PROCEDURE — 99214 OFFICE O/P EST MOD 30 MIN: CPT | Performed by: INTERNAL MEDICINE

## 2022-03-30 PROCEDURE — 3008F BODY MASS INDEX DOCD: CPT | Performed by: INTERNAL MEDICINE

## 2022-03-30 RX ORDER — EMPAGLIFLOZIN 25 MG/1
1 TABLET, FILM COATED ORAL DAILY
Qty: 90 TABLET | Refills: 3 | Status: SHIPPED | OUTPATIENT
Start: 2022-03-30

## 2022-03-30 NOTE — PATIENT INSTRUCTIONS
- A1c is a little better, but still higher than we would like. We will increase your Jardiance dose to 25 mg daily. - Continue other current medications  - Continue focus on healthy diet and regular exercise  - Get diabetic eye exam done in the next couple of months. - Follow up with me in 3 months for chronic issues. Get updated blood work done 1-2 days before your appointment. Make sure you are fasting    It was a pleasure seeing you in the clinic today. Thank you for choosing the [facility] office for your healthcare needs. Please call at [phone] with any questions or concerns.     Pito Gannon MD

## 2022-04-04 RX ORDER — ATORVASTATIN CALCIUM 20 MG/1
TABLET, FILM COATED ORAL
Qty: 90 TABLET | Refills: 1 | Status: SHIPPED | OUTPATIENT
Start: 2022-04-04

## 2022-10-15 ENCOUNTER — LAB ENCOUNTER (OUTPATIENT)
Dept: LAB | Age: 57
End: 2022-10-15
Attending: INTERNAL MEDICINE
Payer: COMMERCIAL

## 2022-10-15 DIAGNOSIS — Z00.00 PREVENTATIVE HEALTH CARE: ICD-10-CM

## 2022-10-15 DIAGNOSIS — Z12.5 SCREENING FOR MALIGNANT NEOPLASM OF PROSTATE: ICD-10-CM

## 2022-10-15 DIAGNOSIS — E11.65 UNCONTROLLED TYPE 2 DIABETES MELLITUS WITH HYPERGLYCEMIA, WITHOUT LONG-TERM CURRENT USE OF INSULIN (HCC): ICD-10-CM

## 2022-10-15 DIAGNOSIS — E78.00 HYPERCHOLESTEROLEMIA: ICD-10-CM

## 2022-10-15 LAB
ALBUMIN SERPL-MCNC: 4 G/DL (ref 3.4–5)
ALBUMIN/GLOB SERPL: 1.2 {RATIO} (ref 1–2)
ALP LIVER SERPL-CCNC: 58 U/L
ALT SERPL-CCNC: 30 U/L
ANION GAP SERPL CALC-SCNC: 7 MMOL/L (ref 0–18)
AST SERPL-CCNC: 15 U/L (ref 15–37)
BASOPHILS # BLD AUTO: 0.05 X10(3) UL (ref 0–0.2)
BASOPHILS NFR BLD AUTO: 0.8 %
BILIRUB SERPL-MCNC: 1 MG/DL (ref 0.1–2)
BUN BLD-MCNC: 15 MG/DL (ref 7–18)
CALCIUM BLD-MCNC: 9.2 MG/DL (ref 8.5–10.1)
CHLORIDE SERPL-SCNC: 107 MMOL/L (ref 98–112)
CHOLEST SERPL-MCNC: 106 MG/DL (ref ?–200)
CO2 SERPL-SCNC: 26 MMOL/L (ref 21–32)
COMPLEXED PSA SERPL-MCNC: 0.37 NG/ML (ref ?–4)
CREAT BLD-MCNC: 1 MG/DL
CREAT UR-SCNC: 706 MG/DL
EOSINOPHIL # BLD AUTO: 0.17 X10(3) UL (ref 0–0.7)
EOSINOPHIL NFR BLD AUTO: 2.6 %
ERYTHROCYTE [DISTWIDTH] IN BLOOD BY AUTOMATED COUNT: 12.3 %
EST. AVERAGE GLUCOSE BLD GHB EST-MCNC: 192 MG/DL (ref 68–126)
FASTING PATIENT LIPID ANSWER: YES
FASTING STATUS PATIENT QL REPORTED: YES
GFR SERPLBLD BASED ON 1.73 SQ M-ARVRAT: 88 ML/MIN/1.73M2 (ref 60–?)
GLOBULIN PLAS-MCNC: 3.3 G/DL (ref 2.8–4.4)
GLUCOSE BLD-MCNC: 230 MG/DL (ref 70–99)
HBA1C MFR BLD: 8.3 % (ref ?–5.7)
HCT VFR BLD AUTO: 49.1 %
HDLC SERPL-MCNC: 31 MG/DL (ref 40–59)
HGB BLD-MCNC: 16.9 G/DL
IMM GRANULOCYTES # BLD AUTO: 0.06 X10(3) UL (ref 0–1)
IMM GRANULOCYTES NFR BLD: 0.9 %
LDLC SERPL CALC-MCNC: 32 MG/DL (ref ?–100)
LYMPHOCYTES # BLD AUTO: 2.28 X10(3) UL (ref 1–4)
LYMPHOCYTES NFR BLD AUTO: 34.6 %
MCH RBC QN AUTO: 32.4 PG (ref 26–34)
MCHC RBC AUTO-ENTMCNC: 34.4 G/DL (ref 31–37)
MCV RBC AUTO: 94.2 FL
MICROALBUMIN UR-MCNC: 60.5 MG/DL
MICROALBUMIN/CREAT 24H UR-RTO: 85.7 UG/MG (ref ?–30)
MONOCYTES # BLD AUTO: 0.59 X10(3) UL (ref 0.1–1)
MONOCYTES NFR BLD AUTO: 9 %
NEUTROPHILS # BLD AUTO: 3.44 X10 (3) UL (ref 1.5–7.7)
NEUTROPHILS # BLD AUTO: 3.44 X10(3) UL (ref 1.5–7.7)
NEUTROPHILS NFR BLD AUTO: 52.1 %
NONHDLC SERPL-MCNC: 75 MG/DL (ref ?–130)
OSMOLALITY SERPL CALC.SUM OF ELEC: 298 MOSM/KG (ref 275–295)
PLATELET # BLD AUTO: 205 10(3)UL (ref 150–450)
POTASSIUM SERPL-SCNC: 4.3 MMOL/L (ref 3.5–5.1)
PROT SERPL-MCNC: 7.3 G/DL (ref 6.4–8.2)
RBC # BLD AUTO: 5.21 X10(6)UL
SODIUM SERPL-SCNC: 140 MMOL/L (ref 136–145)
TRIGL SERPL-MCNC: 284 MG/DL (ref 30–149)
TSI SER-ACNC: 0.77 MIU/ML (ref 0.36–3.74)
VLDLC SERPL CALC-MCNC: 38 MG/DL (ref 0–30)
WBC # BLD AUTO: 6.6 X10(3) UL (ref 4–11)

## 2022-10-15 PROCEDURE — 82570 ASSAY OF URINE CREATININE: CPT

## 2022-10-15 PROCEDURE — 80061 LIPID PANEL: CPT

## 2022-10-15 PROCEDURE — 82043 UR ALBUMIN QUANTITATIVE: CPT

## 2022-10-15 PROCEDURE — 84443 ASSAY THYROID STIM HORMONE: CPT

## 2022-10-15 PROCEDURE — 83036 HEMOGLOBIN GLYCOSYLATED A1C: CPT

## 2022-10-15 PROCEDURE — 80053 COMPREHEN METABOLIC PANEL: CPT

## 2022-10-15 PROCEDURE — 85025 COMPLETE CBC W/AUTO DIFF WBC: CPT

## 2022-10-15 PROCEDURE — 36415 COLL VENOUS BLD VENIPUNCTURE: CPT

## 2023-01-09 ENCOUNTER — TELEPHONE (OUTPATIENT)
Dept: INTERNAL MEDICINE CLINIC | Facility: CLINIC | Age: 58
End: 2023-01-09

## 2023-01-09 DIAGNOSIS — E11.65 UNCONTROLLED TYPE 2 DIABETES MELLITUS WITH HYPERGLYCEMIA, WITHOUT LONG-TERM CURRENT USE OF INSULIN (HCC): ICD-10-CM

## 2023-01-09 NOTE — TELEPHONE ENCOUNTER
Appointment scheduled. Future Appointments   Date Time Provider Kavitha Lewisi   1/24/2023  5:00 PM Josué Dominguez MD [facility] [place]     Patient's wife called to ask if patient can switch back to METFORMIN 850 MG Oral Tab   and   GLIMEPIRIDE 4 MG Oral Tab? States Hugo Yoder is giving him stomach problems. She also would like to know if he needs any labs done prior to his appointment.

## 2023-01-09 NOTE — TELEPHONE ENCOUNTER
Can hold off on labs - only need A1c for now, can do this test with fingerstick at his office visit  Would prefer to see his updated A1c first before switching back/changing diabetes medications but if his side effects are severe I can send in short term prescriptions for metformin and glimepiride

## 2023-01-09 NOTE — TELEPHONE ENCOUNTER
Relayed RP notes and recommendations. Voiced understanding. Pt states he started with abdominal pain and once that improved he started experiencing diarrhea so he stopped taking jardiance. Would prefer short term Rx of METFORMIN 850 MG Oral Tab and GLIMEPIRIDE 4 MG Oral Tab be sent in to [facility] until he sees RP on 1/24/23.

## 2023-01-10 RX ORDER — GLIMEPIRIDE 4 MG/1
4 TABLET ORAL 2 TIMES DAILY
Qty: 180 TABLET | Refills: 0 | Status: SHIPPED | OUTPATIENT
Start: 2023-01-10

## 2023-01-18 DIAGNOSIS — E11.65 UNCONTROLLED TYPE 2 DIABETES MELLITUS WITH HYPERGLYCEMIA, WITHOUT LONG-TERM CURRENT USE OF INSULIN (HCC): ICD-10-CM

## 2023-01-24 ENCOUNTER — OFFICE VISIT (OUTPATIENT)
Dept: INTERNAL MEDICINE CLINIC | Facility: CLINIC | Age: 58
End: 2023-01-24
Payer: COMMERCIAL

## 2023-01-24 VITALS
DIASTOLIC BLOOD PRESSURE: 70 MMHG | WEIGHT: 206.81 LBS | BODY MASS INDEX: 29.61 KG/M2 | SYSTOLIC BLOOD PRESSURE: 130 MMHG | RESPIRATION RATE: 16 BRPM | HEART RATE: 88 BPM | HEIGHT: 70 IN | OXYGEN SATURATION: 97 % | TEMPERATURE: 98 F

## 2023-01-24 DIAGNOSIS — E66.3 OVERWEIGHT (BMI 25.0-29.9): ICD-10-CM

## 2023-01-24 DIAGNOSIS — E78.00 HYPERCHOLESTEROLEMIA: Primary | ICD-10-CM

## 2023-01-24 DIAGNOSIS — E11.65 UNCONTROLLED TYPE 2 DIABETES MELLITUS WITH HYPERGLYCEMIA, WITHOUT LONG-TERM CURRENT USE OF INSULIN (HCC): ICD-10-CM

## 2023-01-24 PROCEDURE — 3078F DIAST BP <80 MM HG: CPT | Performed by: INTERNAL MEDICINE

## 2023-01-24 PROCEDURE — 99214 OFFICE O/P EST MOD 30 MIN: CPT | Performed by: INTERNAL MEDICINE

## 2023-01-24 PROCEDURE — 3008F BODY MASS INDEX DOCD: CPT | Performed by: INTERNAL MEDICINE

## 2023-01-24 PROCEDURE — 3075F SYST BP GE 130 - 139MM HG: CPT | Performed by: INTERNAL MEDICINE

## 2023-01-24 NOTE — PATIENT INSTRUCTIONS
- Diabetes, cholesterol levels were a little better in October but still higher than we would like  - Will increase your metformin dose to 1000 mg twice daily  - Continue glimepiride 4 mg twice daily  - Focus on healthy diet  - Consider following up with our diabetes education nurse, Brian Christianson. She is here in our office every Friday. Call [phone] to schedule with her.  - Follow up with me in 3 months (beginning of May); get fasting blood tests done at least 2 days before this appointment. It was a pleasure seeing you in the clinic today. Thank you for choosing the [facility] office for your healthcare needs. Please call at [phone] with any questions or concerns.     Aleida Hollis MD

## 2023-04-11 ENCOUNTER — OFFICE VISIT (OUTPATIENT)
Dept: INTERNAL MEDICINE CLINIC | Facility: CLINIC | Age: 58
End: 2023-04-11
Payer: COMMERCIAL

## 2023-04-11 VITALS
SYSTOLIC BLOOD PRESSURE: 150 MMHG | BODY MASS INDEX: 30.38 KG/M2 | DIASTOLIC BLOOD PRESSURE: 90 MMHG | OXYGEN SATURATION: 98 % | HEIGHT: 70 IN | RESPIRATION RATE: 18 BRPM | WEIGHT: 212.19 LBS | HEART RATE: 96 BPM | TEMPERATURE: 98 F

## 2023-04-11 DIAGNOSIS — E11.65 UNCONTROLLED TYPE 2 DIABETES MELLITUS WITH HYPERGLYCEMIA, WITHOUT LONG-TERM CURRENT USE OF INSULIN (HCC): Chronic | ICD-10-CM

## 2023-04-11 DIAGNOSIS — E78.00 HYPERCHOLESTEROLEMIA: Primary | Chronic | ICD-10-CM

## 2023-04-11 DIAGNOSIS — R03.0 ELEVATED BLOOD PRESSURE READING: ICD-10-CM

## 2023-04-11 LAB
CARTRIDGE LOT#: 978 NUMERIC
HEMOGLOBIN A1C: 9.2 % (ref 4.3–5.6)

## 2023-04-11 PROCEDURE — 83036 HEMOGLOBIN GLYCOSYLATED A1C: CPT | Performed by: INTERNAL MEDICINE

## 2023-04-11 PROCEDURE — 99214 OFFICE O/P EST MOD 30 MIN: CPT | Performed by: INTERNAL MEDICINE

## 2023-04-11 PROCEDURE — 3080F DIAST BP >= 90 MM HG: CPT | Performed by: INTERNAL MEDICINE

## 2023-04-11 PROCEDURE — 3077F SYST BP >= 140 MM HG: CPT | Performed by: INTERNAL MEDICINE

## 2023-04-11 PROCEDURE — 3008F BODY MASS INDEX DOCD: CPT | Performed by: INTERNAL MEDICINE

## 2023-04-11 PROCEDURE — 3046F HEMOGLOBIN A1C LEVEL >9.0%: CPT | Performed by: INTERNAL MEDICINE

## 2023-04-11 NOTE — PATIENT INSTRUCTIONS
- Diabetes/blood sugar is higher; 9.2% today. This correlates to average blood sugar of 217 for past 3 months.  - Continue with glimepiride and metformin for now  - Schedule follow up with our [facility]:  Geraldine Sahu or [address], [address]  [phone]  - They also come to [address] [place] [address]. - Schedule follow up with me for Saturday, May 6th so we can re-check your blood pressure. It was a pleasure seeing you in the clinic today. Thank you for choosing the [facility] office for your healthcare needs. Please call at [phone] with any questions or concerns.     Jareth Silver MD

## 2023-05-06 ENCOUNTER — OFFICE VISIT (OUTPATIENT)
Dept: INTERNAL MEDICINE CLINIC | Facility: CLINIC | Age: 58
End: 2023-05-06
Payer: COMMERCIAL

## 2023-05-06 VITALS
DIASTOLIC BLOOD PRESSURE: 70 MMHG | OXYGEN SATURATION: 95 % | BODY MASS INDEX: 29.72 KG/M2 | HEART RATE: 72 BPM | WEIGHT: 207.63 LBS | TEMPERATURE: 97 F | RESPIRATION RATE: 16 BRPM | SYSTOLIC BLOOD PRESSURE: 118 MMHG | HEIGHT: 70 IN

## 2023-05-06 DIAGNOSIS — Z23 NEED FOR SHINGLES VACCINE: ICD-10-CM

## 2023-05-06 DIAGNOSIS — Z23 NEED FOR DIPHTHERIA-TETANUS-PERTUSSIS (TDAP) VACCINE: ICD-10-CM

## 2023-05-06 DIAGNOSIS — E78.00 HYPERCHOLESTEROLEMIA: Chronic | ICD-10-CM

## 2023-05-06 DIAGNOSIS — E11.65 UNCONTROLLED TYPE 2 DIABETES MELLITUS WITH HYPERGLYCEMIA, WITHOUT LONG-TERM CURRENT USE OF INSULIN (HCC): Chronic | ICD-10-CM

## 2023-05-06 DIAGNOSIS — R03.0 ELEVATED BLOOD PRESSURE READING: Primary | ICD-10-CM

## 2023-05-06 PROCEDURE — 90750 HZV VACC RECOMBINANT IM: CPT | Performed by: INTERNAL MEDICINE

## 2023-05-06 PROCEDURE — 99214 OFFICE O/P EST MOD 30 MIN: CPT | Performed by: INTERNAL MEDICINE

## 2023-05-06 PROCEDURE — 90471 IMMUNIZATION ADMIN: CPT | Performed by: INTERNAL MEDICINE

## 2023-05-06 PROCEDURE — 90472 IMMUNIZATION ADMIN EACH ADD: CPT | Performed by: INTERNAL MEDICINE

## 2023-05-06 PROCEDURE — 3074F SYST BP LT 130 MM HG: CPT | Performed by: INTERNAL MEDICINE

## 2023-05-06 PROCEDURE — 90715 TDAP VACCINE 7 YRS/> IM: CPT | Performed by: INTERNAL MEDICINE

## 2023-05-06 PROCEDURE — 3008F BODY MASS INDEX DOCD: CPT | Performed by: INTERNAL MEDICINE

## 2023-05-06 PROCEDURE — 3078F DIAST BP <80 MM HG: CPT | Performed by: INTERNAL MEDICINE

## 2023-05-06 NOTE — PATIENT INSTRUCTIONS
- Blood pressure looks great. No need for blood pressure medication at this time. - Tetanus, first shingles shot given today. You will need another shingles shot in 2-6 months. - Follow up with me in 6 months for chronic issues; follow up earlier as needed. It was a pleasure seeing you in the clinic today. Thank you for choosing the [facility] office for your healthcare needs. Please call at [phone] with any questions or concerns.     Carolin Galvez MD

## 2023-05-09 ENCOUNTER — TELEPHONE (OUTPATIENT)
Dept: INTERNAL MEDICINE CLINIC | Facility: CLINIC | Age: 58
End: 2023-05-09

## 2023-05-09 NOTE — TELEPHONE ENCOUNTER
Incoming diabetic eye exam from [address] [place] [address] in RP basket for review     Flowsheet updated

## 2023-06-07 DIAGNOSIS — E11.65 UNCONTROLLED TYPE 2 DIABETES MELLITUS WITH HYPERGLYCEMIA, WITHOUT LONG-TERM CURRENT USE OF INSULIN (HCC): ICD-10-CM

## 2023-06-08 RX ORDER — GLIMEPIRIDE 4 MG/1
4 TABLET ORAL 2 TIMES DAILY
Qty: 180 TABLET | Refills: 0 | Status: SHIPPED | OUTPATIENT
Start: 2023-06-08

## 2023-09-07 DIAGNOSIS — E11.65 UNCONTROLLED TYPE 2 DIABETES MELLITUS WITH HYPERGLYCEMIA, WITHOUT LONG-TERM CURRENT USE OF INSULIN (HCC): ICD-10-CM

## 2023-09-07 NOTE — TELEPHONE ENCOUNTER
METFORMIN HCL 1,000 MG TABLET          Will file in chart as: METFORMIN HCL 1000 MG Oral Tab    Sig: TAKE 1 TABLET BY MOUTH TWICE A DAY WITH MEALS    Disp: 180 tablet    Refills: 0 (Pharmacy requested: Not specified)    Start: 9/7/2023    Class: Normal    Non-formulary For: Uncontrolled type 2 diabetes mellitus with hyperglycemia, without long-term current use of insulin (HCC)    Last ordered: 3 months ago by Apple Petit MD Last refill: 6/8/2023    Rx #: [id number]    Diabetic Medication Protocol Payluv0609/07/2023 12:29 AM   Protocol Details Last HgBA1C < 7.5    HgBA1C procedure resulted in past 6 months    Microalbumin procedure in past 12 months or taking ACE/ARB    Appointment in past 6 or next 3 months      LOV 5/6/23  RTC 6 months   Filled 6/8/23 180 tabs 0 refills   Last A1c 4/11/23  Future Appointments   Date Time Provider Kavitha Amaro   11/7/2023  5:30 PM Nellie Parra MD [facility] [place]

## 2023-09-21 DIAGNOSIS — E11.65 UNCONTROLLED TYPE 2 DIABETES MELLITUS WITH HYPERGLYCEMIA, WITHOUT LONG-TERM CURRENT USE OF INSULIN (HCC): ICD-10-CM

## 2023-09-22 RX ORDER — GLIMEPIRIDE 4 MG/1
4 TABLET ORAL 2 TIMES DAILY
Qty: 180 TABLET | Refills: 1 | Status: SHIPPED | OUTPATIENT
Start: 2023-09-22

## 2023-09-22 NOTE — TELEPHONE ENCOUNTER
Glimepiride 4 mg    Diabetic Medication Protocol Eenfjf0009/21/2023 05:49 PM   Protocol Details Last HgBA1C < 7.5      Filled 6-8-23  [address]  0 refills  Future Appointments   Date Time Provider Kavitha Amaro   11/7/2023  5:30 PM Brad Garcia MD [facility] [place]   LOV 5-6-23 RP  RTC 6 mo.   Labs 4-11-23 A1c

## 2023-11-07 ENCOUNTER — OFFICE VISIT (OUTPATIENT)
Dept: INTERNAL MEDICINE CLINIC | Facility: CLINIC | Age: 58
End: 2023-11-07
Payer: COMMERCIAL

## 2023-11-07 DIAGNOSIS — N52.9 ERECTILE DYSFUNCTION, UNSPECIFIED ERECTILE DYSFUNCTION TYPE: ICD-10-CM

## 2023-11-07 DIAGNOSIS — Z23 NEED FOR SHINGLES VACCINE: ICD-10-CM

## 2023-11-07 DIAGNOSIS — Z12.5 SCREENING FOR MALIGNANT NEOPLASM OF PROSTATE: ICD-10-CM

## 2023-11-07 DIAGNOSIS — E66.3 OVERWEIGHT (BMI 25.0-29.9): ICD-10-CM

## 2023-11-07 DIAGNOSIS — E11.65 UNCONTROLLED TYPE 2 DIABETES MELLITUS WITH HYPERGLYCEMIA, WITHOUT LONG-TERM CURRENT USE OF INSULIN (HCC): Chronic | ICD-10-CM

## 2023-11-07 DIAGNOSIS — E78.00 HYPERCHOLESTEROLEMIA: Primary | Chronic | ICD-10-CM

## 2023-11-07 LAB
CARTRIDGE LOT#: ABNORMAL NUMERIC
HEMOGLOBIN A1C: 7.9 % (ref 4.3–5.6)

## 2023-11-07 PROCEDURE — 3008F BODY MASS INDEX DOCD: CPT | Performed by: INTERNAL MEDICINE

## 2023-11-07 PROCEDURE — 83036 HEMOGLOBIN GLYCOSYLATED A1C: CPT | Performed by: INTERNAL MEDICINE

## 2023-11-07 PROCEDURE — 99214 OFFICE O/P EST MOD 30 MIN: CPT | Performed by: INTERNAL MEDICINE

## 2023-11-07 PROCEDURE — 3051F HG A1C>EQUAL 7.0%<8.0%: CPT | Performed by: INTERNAL MEDICINE

## 2023-11-07 PROCEDURE — 90750 HZV VACC RECOMBINANT IM: CPT | Performed by: INTERNAL MEDICINE

## 2023-11-07 PROCEDURE — 3077F SYST BP >= 140 MM HG: CPT | Performed by: INTERNAL MEDICINE

## 2023-11-07 PROCEDURE — 90471 IMMUNIZATION ADMIN: CPT | Performed by: INTERNAL MEDICINE

## 2023-11-07 PROCEDURE — 3079F DIAST BP 80-89 MM HG: CPT | Performed by: INTERNAL MEDICINE

## 2023-11-07 RX ORDER — ATORVASTATIN CALCIUM 20 MG/1
20 TABLET, FILM COATED ORAL NIGHTLY
Qty: 90 TABLET | Refills: 1 | Status: SHIPPED | OUTPATIENT
Start: 2023-11-07

## 2023-11-07 NOTE — PATIENT INSTRUCTIONS
- Diabetes is better, still a little higher than we would like. Continue glimepiride, metformin. Try and cut down on carbs, sugars. - Blood pressure was a little high today. Try and cut down on Vitamin water intake as this is high in salt. - Get rest of blood and urine tests done when fasting (water and medications only for 8 hours). Our lab [place] is open Monday - Saturday. - Schedule stress test to look into blood flow issues. Call central scheduling at [phone] to schedule. Based on results we may consider prescription of Cialis/Viagra. - Second shingles shot given today. You do not need any more shingles shots.  - Follow up in 6 months (May) for chronic issues; follow up earlier as needed. It was a pleasure seeing you in the clinic today. Thank you for choosing the [facility] office for your healthcare needs. Please call at [phone] with any questions or concerns.     Carolin Galvez MD

## 2023-11-08 VITALS
SYSTOLIC BLOOD PRESSURE: 142 MMHG | HEIGHT: 70.25 IN | RESPIRATION RATE: 16 BRPM | OXYGEN SATURATION: 98 % | HEART RATE: 89 BPM | TEMPERATURE: 98 F | DIASTOLIC BLOOD PRESSURE: 86 MMHG | BODY MASS INDEX: 30.12 KG/M2 | WEIGHT: 210.38 LBS

## 2023-12-09 ENCOUNTER — HOSPITAL ENCOUNTER (OUTPATIENT)
Dept: CV DIAGNOSTICS | Facility: HOSPITAL | Age: 58
Discharge: HOME | End: 2023-12-09
Attending: INTERNAL MEDICINE
Payer: COMMERCIAL

## 2023-12-09 DIAGNOSIS — E11.65 UNCONTROLLED TYPE 2 DIABETES MELLITUS WITH HYPERGLYCEMIA, WITHOUT LONG-TERM CURRENT USE OF INSULIN (HCC): Chronic | ICD-10-CM

## 2023-12-09 DIAGNOSIS — E78.00 HYPERCHOLESTEROLEMIA: Chronic | ICD-10-CM

## 2023-12-09 DIAGNOSIS — N52.9 ERECTILE DYSFUNCTION, UNSPECIFIED ERECTILE DYSFUNCTION TYPE: ICD-10-CM

## 2023-12-09 DIAGNOSIS — E66.3 OVERWEIGHT (BMI 25.0-29.9): ICD-10-CM

## 2023-12-09 PROCEDURE — 93017 CV STRESS TEST TRACING ONLY: CPT | Performed by: INTERNAL MEDICINE

## 2023-12-09 PROCEDURE — 93018 CV STRESS TEST I&R ONLY: CPT | Performed by: INTERNAL MEDICINE

## 2023-12-12 DIAGNOSIS — N52.9 ERECTILE DYSFUNCTION, UNSPECIFIED ERECTILE DYSFUNCTION TYPE: Primary | ICD-10-CM

## 2023-12-12 RX ORDER — SILDENAFIL 50 MG/1
50 TABLET, FILM COATED ORAL
Qty: 8 TABLET | Refills: 5 | Status: SHIPPED | OUTPATIENT
Start: 2023-12-12

## 2024-01-06 ENCOUNTER — LAB ENCOUNTER (OUTPATIENT)
Dept: LAB | Age: 59
End: 2024-01-06
Attending: INTERNAL MEDICINE
Payer: COMMERCIAL

## 2024-01-06 DIAGNOSIS — E11.65 UNCONTROLLED TYPE 2 DIABETES MELLITUS WITH HYPERGLYCEMIA, WITHOUT LONG-TERM CURRENT USE OF INSULIN (HCC): Chronic | ICD-10-CM

## 2024-01-06 DIAGNOSIS — Z12.5 SCREENING FOR MALIGNANT NEOPLASM OF PROSTATE: ICD-10-CM

## 2024-01-06 DIAGNOSIS — E78.00 HYPERCHOLESTEROLEMIA: ICD-10-CM

## 2024-01-06 DIAGNOSIS — E66.3 OVERWEIGHT (BMI 25.0-29.9): ICD-10-CM

## 2024-01-06 LAB
ALBUMIN SERPL-MCNC: 3.7 G/DL (ref 3.4–5)
ALBUMIN/GLOB SERPL: 1.1 {RATIO} (ref 1–2)
ALP LIVER SERPL-CCNC: 76 U/L
ALT SERPL-CCNC: 42 U/L
ANION GAP SERPL CALC-SCNC: 5 MMOL/L (ref 0–18)
AST SERPL-CCNC: 26 U/L (ref 15–37)
BASOPHILS # BLD AUTO: 0.03 X10(3) UL (ref 0–0.2)
BASOPHILS NFR BLD AUTO: 0.5 %
BILIRUB SERPL-MCNC: 0.5 MG/DL (ref 0.1–2)
BUN BLD-MCNC: 14 MG/DL (ref 9–23)
CALCIUM BLD-MCNC: 8 MG/DL (ref 8.5–10.1)
CHLORIDE SERPL-SCNC: 107 MMOL/L (ref 98–112)
CHOLEST SERPL-MCNC: 169 MG/DL (ref ?–200)
CO2 SERPL-SCNC: 25 MMOL/L (ref 21–32)
COMPLEXED PSA SERPL-MCNC: 0.32 NG/ML (ref ?–4)
CREAT BLD-MCNC: 0.84 MG/DL
CREAT UR-SCNC: 221 MG/DL
EGFRCR SERPLBLD CKD-EPI 2021: 102 ML/MIN/1.73M2 (ref 60–?)
EOSINOPHIL # BLD AUTO: 0.13 X10(3) UL (ref 0–0.7)
EOSINOPHIL NFR BLD AUTO: 2.1 %
ERYTHROCYTE [DISTWIDTH] IN BLOOD BY AUTOMATED COUNT: 11.7 %
FASTING PATIENT LIPID ANSWER: YES
FASTING STATUS PATIENT QL REPORTED: YES
GLOBULIN PLAS-MCNC: 3.3 G/DL (ref 2.8–4.4)
GLUCOSE BLD-MCNC: 256 MG/DL (ref 70–99)
HCT VFR BLD AUTO: 44.5 %
HDLC SERPL-MCNC: 24 MG/DL (ref 40–59)
HGB BLD-MCNC: 16 G/DL
IMM GRANULOCYTES # BLD AUTO: 0.03 X10(3) UL (ref 0–1)
IMM GRANULOCYTES NFR BLD: 0.5 %
LDLC SERPL DIRECT ASSAY-MCNC: 50 MG/DL (ref ?–100)
LYMPHOCYTES # BLD AUTO: 2.63 X10(3) UL (ref 1–4)
LYMPHOCYTES NFR BLD AUTO: 42 %
MCH RBC QN AUTO: 31.9 PG (ref 26–34)
MCHC RBC AUTO-ENTMCNC: 36 G/DL (ref 31–37)
MCV RBC AUTO: 88.8 FL
MICROALBUMIN UR-MCNC: 14.7 MG/DL
MICROALBUMIN/CREAT 24H UR-RTO: 66.5 UG/MG (ref ?–30)
MONOCYTES # BLD AUTO: 0.54 X10(3) UL (ref 0.1–1)
MONOCYTES NFR BLD AUTO: 8.6 %
NEUTROPHILS # BLD AUTO: 2.9 X10 (3) UL (ref 1.5–7.7)
NEUTROPHILS # BLD AUTO: 2.9 X10(3) UL (ref 1.5–7.7)
NEUTROPHILS NFR BLD AUTO: 46.3 %
NONHDLC SERPL-MCNC: 145 MG/DL (ref ?–130)
OSMOLALITY SERPL CALC.SUM OF ELEC: 293 MOSM/KG (ref 275–295)
PLATELET # BLD AUTO: 200 10(3)UL (ref 150–450)
POTASSIUM SERPL-SCNC: 3.9 MMOL/L (ref 3.5–5.1)
PROT SERPL-MCNC: 7 G/DL (ref 6.4–8.2)
RBC # BLD AUTO: 5.01 X10(6)UL
SODIUM SERPL-SCNC: 137 MMOL/L (ref 136–145)
TRIGL SERPL-MCNC: 1211 MG/DL (ref 30–149)
WBC # BLD AUTO: 6.3 X10(3) UL (ref 4–11)

## 2024-01-06 PROCEDURE — 36415 COLL VENOUS BLD VENIPUNCTURE: CPT

## 2024-01-06 PROCEDURE — 80061 LIPID PANEL: CPT

## 2024-01-06 PROCEDURE — 80053 COMPREHEN METABOLIC PANEL: CPT

## 2024-01-06 PROCEDURE — 82570 ASSAY OF URINE CREATININE: CPT

## 2024-01-06 PROCEDURE — 83721 ASSAY OF BLOOD LIPOPROTEIN: CPT

## 2024-01-06 PROCEDURE — 85025 COMPLETE CBC W/AUTO DIFF WBC: CPT

## 2024-01-06 PROCEDURE — 82043 UR ALBUMIN QUANTITATIVE: CPT

## 2024-01-07 DIAGNOSIS — E78.00 HYPERCHOLESTEROLEMIA: Primary | Chronic | ICD-10-CM

## 2024-01-07 DIAGNOSIS — E11.65 UNCONTROLLED TYPE 2 DIABETES MELLITUS WITH HYPERGLYCEMIA, WITHOUT LONG-TERM CURRENT USE OF INSULIN (HCC): Chronic | ICD-10-CM

## 2024-01-07 RX ORDER — ATORVASTATIN CALCIUM 40 MG/1
40 TABLET, FILM COATED ORAL NIGHTLY
Qty: 90 TABLET | Refills: 1 | Status: SHIPPED | OUTPATIENT
Start: 2024-01-07

## 2024-07-19 ENCOUNTER — OFFICE VISIT (OUTPATIENT)
Dept: INTERNAL MEDICINE CLINIC | Facility: CLINIC | Age: 59
End: 2024-07-19
Payer: COMMERCIAL

## 2024-07-19 VITALS
OXYGEN SATURATION: 95 % | HEART RATE: 93 BPM | RESPIRATION RATE: 16 BRPM | WEIGHT: 193.81 LBS | BODY MASS INDEX: 27.75 KG/M2 | DIASTOLIC BLOOD PRESSURE: 82 MMHG | HEIGHT: 70 IN | SYSTOLIC BLOOD PRESSURE: 110 MMHG

## 2024-07-19 DIAGNOSIS — Z71.84 TRAVEL ADVICE ENCOUNTER: Primary | ICD-10-CM

## 2024-07-19 DIAGNOSIS — E11.9 TYPE 2 DIABETES MELLITUS WITHOUT COMPLICATION, WITHOUT LONG-TERM CURRENT USE OF INSULIN (HCC): ICD-10-CM

## 2024-07-19 LAB — HEMOGLOBIN A1C: 11.3 % (ref 4.3–5.6)

## 2024-07-19 RX ORDER — ATOVAQUONE AND PROGUANIL HYDROCHLORIDE 250; 100 MG/1; MG/1
1 TABLET, FILM COATED ORAL DAILY
Qty: 30 TABLET | Refills: 0 | Status: SHIPPED | OUTPATIENT
Start: 2024-07-19 | End: 2024-08-18

## 2024-07-19 NOTE — PROGRESS NOTES
Established Patient Office Visit    CC:    Follow up for the diabetes      HPI:     59 year old man with hx of diabetes presents for the follow up and travel advise as he is going to [facility] in [place].    Misses the night time doses of diabetes medication. Loves rice and tortillas.     Past Surgical History:   Procedure Laterality Date    Colonoscopy      Colonoscopy      2 years ago    Hernia surgery  2019    incarcerated ventral hernia        Social History:  Social History     Socioeconomic History    Marital status:    Occupational History    Occupation:    Tobacco Use    Smoking status: Former     Current packs/day: 0.00     Average packs/day: 0.5 packs/day for 10.0 years (5.0 ttl pk-yrs)     Types: Cigarettes     Start date: 2002     Quit date: 2012     Years since quittin.4    Smokeless tobacco: Never   Vaping Use    Vaping status: Never Used   Substance and Sexual Activity    Alcohol use: Yes     Comment: sometimes    Drug use: No   Other Topics Concern    Caffeine Concern Yes     Comment: 1 large [employer] coffee daily    Exercise Yes     Comment: Walks 1 mile daily     Family History:  Family History   Problem Relation Age of Onset    Diabetes Father          in his 30s    Heart Attack Father 36        Effects from Diabetes    Musculo-skelatal Disorder Mother         Arthritis    Other (Pre-diabetes) Sister     Other (Liver Disease) Brother     Diabetes Paternal Uncle     Diabetes Paternal Uncle     Other (ESRD) Paternal Uncle     Cancer Neg     Stroke Neg      Allergies:  No Known Allergies  Current Meds:  Current Outpatient Medications on File Prior to Visit   Medication Sig Dispense Refill    Sildenafil Citrate 50 MG Oral Tab Take 1 tablet (50 mg total) by mouth daily as needed for Erectile Dysfunction. 8 tablet 5    atorvastatin 40 MG Oral Tab Take 1 tablet (40 mg total) by mouth nightly. 90 tablet 1    glimepiride 4 MG Oral Tab Take 1 tablet (4 mg total) by  mouth 2 (two) times daily. 180 tablet 1    metFORMIN HCl 1000 MG Oral Tab Take 1 tablet (1,000 mg total) by mouth 2 (two) times daily with meals. 180 tablet 1    VASCEPA 1 g Oral Cap Take 2 capsules by mouth 2 (two) times daily. 360 capsule 1    ACCU-CHEK JOSE L PLUS In Vitro Strip TEST BLOOD SUGAR TWICE A  strip 2    Multiple Vitamins-Minerals Oral Misc Take 1 tablet by mouth daily.       No current facility-administered medications on file prior to visit.         REVIEW OF SYSTEMS   Constitutional: no fatigue, normal energy,  no weight changes   HENT: no headache, normal sinuses and no mouth issues   Eyes: . normal vision no eye pain  Respiratory: normal respirations no cough   Cardiovascular: no chest pain  or palpitations   Gastrointestinal: normal bowels and no abd pains   Genitourinary:  normal urination no hematuria, no frequency   Musculoskeletal: . No joint pain, no muscular pain  Skin: no rashes or skin lesions that are new   Neurological:  no weakness, no numbness, normal gait  Hematological:  no bruises or bleeding   Psychiatric/Behavioral: normal mood no anxiety normal behavior     /82 (BP Location: Left arm, Patient Position: Sitting, Cuff Size: adult)   Pulse 93   Resp 16   Ht 5' 10\" (1.778 m)   Wt 193 lb 12.8 oz (87.9 kg)   SpO2 95%   BMI 27.81 kg/m²     Lab Results   Component Value Date     (H) 10/15/2022    A1C 7.9 (A) 11/07/2023      Lab Results   Component Value Date    WBC 6.3 01/06/2024    RBC 5.01 01/06/2024    HGB 16.0 01/06/2024    HCT 44.5 01/06/2024    MCV 88.8 01/06/2024    MCH 31.9 01/06/2024    MCHC 36.0 01/06/2024    RDW 11.7 01/06/2024    .0 01/06/2024      No results found for this or any previous visit (from the past 4380 hour(s)).   Lab Results   Component Value Date    CHOLEST 169 01/06/2024    TRIG 1,211 (H) 01/06/2024    HDL 24 (L) 01/06/2024    LDL  01/06/2024      Comment:      Unable to calculate LDL due to Triglycerides >800  mg/dL        Desirable <100 mg/dL   Borderline 100-129 mg/dL   High     >=130mg/dL        VLDL  01/06/2024      Comment:      Unable to calculate VLDL due to Triglycerides >800 mg/dL        TCHDLRATIO 2.0 02/07/2020    NONHDLC 145 (H) 01/06/2024        PHYSICAL EXAM:   Constitutional: Vital signs reviewed as noted, well developed  Pleasant patient and appears their stated age  HEENT: NCAT  No palor, No icterus, No pedal edema, No lymphadenopathy  Nose: normal nose  Eyes: pupils reactive bilaterally, uses glasses  Cardiovascular: nl s1 s2 no murmur or gallop  Pulmonary/Chest: B/L equal air entry, vesicular breath sounds B/L, no wheezes or crackles  Abdominal: Soft NT , no palpable organomegaly  Musculoskeletal:  normal ROM in UE and LE  Foot exam : no ulcers. No corns. Monofilament test -- normal tactile sensations  Neurological:  no weakness in UE and LE  Skin: no rashes or bruises  Psychiatric:normal mood and behavior      ASSESSMENT AND PLAN:   No diagnosis found.    Uncontrolled Diabetes Mellitus :         At times forgets the medications at night. Advised him to be adherent. Pt is not ready for insulin injections. Also advised to limit the carbohydrates.    Normal feet exam    2. Cardiac stress test last year : exercise stress test was negative 2023.    3. Travel medicine -- Although referred to travel med physician at [facility], the patient elected to get prescription from me.    A) Oral typhoid vaccine every other day ( complete 2 weeks prior)  B) Atovaquine plus proguanil 2 day prior and continue one week after coming back )  C) Hepatitis vaccine -- 0 day, 1 month and 6 months        Patient/Caregiver Education: Patient/Caregiver Education: There are no barriers to learning. Medical education done. Outcome: Patient verbalizes understanding. Patient is notified to call with any questions, complications, allergies, or worsening or changing symptoms.  Patient is to call with any side effects or complications from  the treatments as a result of today.    No follow-ups on file.    Reviewed Past Medical History and   Patient Active Problem List   Diagnosis    Hypercholesterolemia    Overweight (BMI 25.0-29.9)    Lumbar disc disease    Diverticulosis of large intestine without perforation or abscess without bleeding    Uncontrolled type 2 diabetes mellitus with hyperglycemia, without long-term current use of insulin (HCC)    History of adenomatous polyp of colon    Erectile dysfunction       No orders of the defined types were placed in this encounter.    Requested Prescriptions      No prescriptions requested or ordered in this encounter         Edwin Villegas MD

## 2024-07-25 ENCOUNTER — TELEPHONE (OUTPATIENT)
Dept: INTERNAL MEDICINE CLINIC | Facility: CLINIC | Age: 59
End: 2024-07-25

## 2024-07-25 DIAGNOSIS — Z29.89 NEED FOR MALARIA PROPHYLAXIS: Primary | ICD-10-CM

## 2024-07-25 RX ORDER — ATOVAQUONE AND PROGUANIL HYDROCHLORIDE 250; 100 MG/1; MG/1
1 TABLET, FILM COATED ORAL DAILY
Qty: 30 TABLET | Refills: 0 | Status: SHIPPED | OUTPATIENT
Start: 2024-07-25 | End: 2024-08-24

## 2024-07-25 NOTE — TELEPHONE ENCOUNTER
Patients spouse Nita called in.     Neededing medication:   Atovaquone-Proguanil HCl 250-100 MG Oral Tab 30 tablet    to be sent to [facility] pharmacy, was originally sent to [facility].     Pt is aware there will be a out of pocket cost due to insurance not covering p/u at a local pharmacy [facility].     Pharmacy: [facility]/pharmacy #[id number]     CB# [phone]

## 2024-09-27 DIAGNOSIS — E11.65 UNCONTROLLED TYPE 2 DIABETES MELLITUS WITH HYPERGLYCEMIA, WITHOUT LONG-TERM CURRENT USE OF INSULIN (HCC): ICD-10-CM

## 2024-09-27 NOTE — TELEPHONE ENCOUNTER
METFORMIN HCL 1,000 MG TABLET          Will file in chart as: METFORMIN HCL 1000 MG Oral Tab    Sig: TAKE 1 TABLET BY MOUTH TWICE A DAY WITH MEALS    Disp: 180 tablet    Refills: 1    Start: 9/27/2024    Class: Normal    Non-formulary For: Uncontrolled type 2 diabetes mellitus with hyperglycemia, without long-term current use of insulin (HCC)    Last ordered: 1 year ago (9/7/2023) by Kadie Stevenson MD    Last refill: 7/3/2024    Rx #: [id number]    Diabetes Medication Protocol [id number]/27/2024 12:16 AM   Protocol Details Last A1C < 7.5 and within past 6 months    In person appointment or virtual visit in the past 6 mos or appointment in next 3 mos    Microalbumin procedure in past 12 months or taking ACE/ARB    EGFRCR or GFRNAA > 50    GFR in the past 12 months      LOV 7/19/24  RTC none  Filled 9/7/23 180 tabs 1 refill  Last A1c 7/19/24  No future appointments.

## 2024-10-11 DIAGNOSIS — E11.65 UNCONTROLLED TYPE 2 DIABETES MELLITUS WITH HYPERGLYCEMIA, WITHOUT LONG-TERM CURRENT USE OF INSULIN (HCC): ICD-10-CM

## 2024-10-11 NOTE — TELEPHONE ENCOUNTER
90-Day Prescription Request for Metformin 1000 mg Tablet    LOV: 7/19/2024 with Dr. Villegas  RTC: no follow-up on file  Last Relevant Labs: 7/19/2024 (A1C)  Filled: 9/27/2024    #60 with 0 refills    No future appointments.

## 2024-10-30 ENCOUNTER — TELEPHONE (OUTPATIENT)
Dept: INTERNAL MEDICINE CLINIC | Facility: CLINIC | Age: 59
End: 2024-10-30

## 2025-04-18 ENCOUNTER — OFFICE VISIT (OUTPATIENT)
Dept: INTERNAL MEDICINE CLINIC | Facility: CLINIC | Age: 60
End: 2025-04-18
Payer: COMMERCIAL

## 2025-04-18 VITALS
DIASTOLIC BLOOD PRESSURE: 76 MMHG | TEMPERATURE: 97 F | BODY MASS INDEX: 25.48 KG/M2 | HEART RATE: 88 BPM | RESPIRATION RATE: 16 BRPM | OXYGEN SATURATION: 98 % | SYSTOLIC BLOOD PRESSURE: 104 MMHG | WEIGHT: 180 LBS | HEIGHT: 70.5 IN

## 2025-04-18 DIAGNOSIS — E11.65 UNCONTROLLED TYPE 2 DIABETES MELLITUS WITH HYPERGLYCEMIA, WITHOUT LONG-TERM CURRENT USE OF INSULIN (HCC): Chronic | ICD-10-CM

## 2025-04-18 DIAGNOSIS — E78.00 HYPERCHOLESTEROLEMIA: Primary | Chronic | ICD-10-CM

## 2025-04-18 DIAGNOSIS — R25.1 TREMOR OF BOTH HANDS: ICD-10-CM

## 2025-04-18 DIAGNOSIS — Z12.5 SCREENING FOR MALIGNANT NEOPLASM OF PROSTATE: ICD-10-CM

## 2025-04-18 DIAGNOSIS — Z23 NEED FOR PNEUMOCOCCAL VACCINATION: ICD-10-CM

## 2025-04-18 LAB — HEMOGLOBIN A1C: 13 % (ref 4.3–5.6)

## 2025-04-18 RX ORDER — GLIMEPIRIDE 4 MG/1
4 TABLET ORAL 2 TIMES DAILY
Qty: 180 TABLET | Refills: 0 | Status: SHIPPED | OUTPATIENT
Start: 2025-04-18

## 2025-04-18 RX ORDER — ATORVASTATIN CALCIUM 40 MG/1
40 TABLET, FILM COATED ORAL NIGHTLY
Qty: 90 TABLET | Refills: 1 | Status: SHIPPED | OUTPATIENT
Start: 2025-04-18

## 2025-04-18 NOTE — PATIENT INSTRUCTIONS
- Diabetes/blood sugars are higher.  Will restart metformin and glimepiride.  - Schedule appointment with our diabetes specialists:  [facility]  [address]  [place] [address]  [phone]    [facility]  [address] [place] [address]  [phone]    [address] [facility] [address]  [place] [address]  [phone]    [facility]  [address]  [address]  [place], [place] [address] [phone]    [address]  [place], [place] [address] [phone]  (They also come to our office every Wednesday).    - Get fasting blood and urine tests done in 1 month.  Make sure you are fasting for 8 hours.    It was a pleasure seeing you in the clinic today.  Thank you for choosing the [facility] office for your healthcare needs. Please call at [phone] with any questions or concerns.    Kadie Stevenson MD

## 2025-04-18 NOTE — PROGRESS NOTES
Onofre Gaffney is a 59 year old male.   HPI:   Patient presents for follow up on chronic medical issues.    Hypercholesterolemia - previously on statin therapy, though I do not see any recent refills filled.   DM II - uncontrolled.  Previously on metformin, glipizide.  Had GI side effects with Jardiance in the past.  He stopped all medications.  Was trying to control diabetes just with diet.  Has noticed some hand tremors - worsening of late.    Past medical, family, surgical and social history were reviewed as listed in the chart, and are unchanged from previous visit.  REVIEW OF SYSTEMS:   GENERAL/ const: fatigue; no fevers/chills, no unintentional weight loss  SKIN: denies any unusual skin lesions  EYES:no vision problems  HEENT: denies sinus pain or sinus tenderness  LUNGS: denies shortness of breath   CARDIOVASCULAR: denies chest pain  GI: denies nausea/emesis/ abdominal pain diarrhea constipation  : denies dysuria   NEURO: hand tremors; denies headaches  PSYCHIATRIC: denies issues  ENDOCRINE: no hot/cold intolerance  ALLERGY: Allergies[1]  PAST HISTORY:   Medications - Current[2]  Medical:  has a past medical history of Back pain, Back problem, Belching, Benign neoplasm of descending colon (6/11/2021), Benign neoplasm of transverse colon (6/11/2021), Diabetes (HCC), Diabetes mellitus (HCC), Fatigue, Frequent urination, Herniated disc, High cholesterol, Neuropathy, Pain in joints, Polyp of colon (12/6/2018), Sputum production, Tremors of nervous system, Type 2 diabetes mellitus without complication, without long-term current use of insulin (HCC) (1/19/2018), and Wears glasses.  Surgical:  has a past surgical history that includes colonoscopy; hernia surgery (07/29/2019); and colonoscopy.  Family: family history includes Diabetes in his father, paternal uncle, and paternal uncle; ESRD in his paternal uncle; Heart Attack (age of onset: 36) in his father; Liver Disease in his brother; Musculo-skelatal  Disorder in his mother; Pre-diabetes in his sister.  Social:  reports that he quit smoking about 13 years ago. His smoking use included cigarettes. He started smoking about 23 years ago. He has a 5 pack-year smoking history. He has never used smokeless tobacco. He reports current alcohol use. He reports that he does not use drugs.  Wt Readings from Last 6 Encounters:   04/18/25 180 lb (81.6 kg)   07/19/24 193 lb 12.8 oz (87.9 kg)   11/07/23 210 lb 6.4 oz (95.4 kg)   05/06/23 207 lb 9.6 oz (94.2 kg)   04/11/23 212 lb 3.2 oz (96.3 kg)   01/24/23 206 lb 12.8 oz (93.8 kg)     EXAM:   /76 (BP Location: Right arm, Patient Position: Sitting, Cuff Size: adult)   Pulse 88   Temp 97.4 °F (36.3 °C) (Temporal)   Resp 16   Ht 5' 10.5\" (1.791 m)   Wt 180 lb (81.6 kg)   SpO2 98%   BMI 25.46 kg/m²   GENERAL: Alert and oriented, well developed, well nourished,in no apparent distress  SKIN: no rashes,no suspicious lesions  HEENT: atraumatic, PERRLA, EOMI, normal lid and conjunctiva  NECK: supple, no jvd, no thyromegaly  LUNGS: clear to auscultation bilaterally, no wheezing/rubs  CARDIO: RRR without murmurs.  No clubbing, cyanosis or edema.  GI: soft non tender nondistended no hepatosplenomegaly, bowel sounds throughout  NEURO: CN II-XII intact, 5/5 strength all extremities; intention tremor of both hands  Bilateral barefoot skin diabetic exam is normal, visualized feet and the appearance is normal.  Bilateral monofilament/sensation of both feet is normal.  Pulsation pedal pulse exam of both lower legs/feet is normal as well.  MS: Full ROM  PSYCH: pleasant, appropriate mood and affect  ASSESSMENT AND PLAN:   1. Hypercholesterolemia  Lipids likely elevated - patient stopped taking his statin. Will resume atorvastatin 40 mg qhs.  - atorvastatin 40 MG Oral Tab; Take 1 tablet (40 mg total) by mouth nightly.  Dispense: 90 tablet; Refill: 1  - Lipid Panel; Future    2. Uncontrolled type 2 diabetes mellitus with hyperglycemia,  without long-term current use of insulin (HCC)  Uncontrolled.  Patient stopped his medications and wanted to try and control diabetes with diet alone.  A1c 13% today in office.  Will restart metformin, glimepiride as below.  Did not tolerate Jardiance.  May benefit from GLP-1RA or may need insulin.  Will have patient follow up with Endo/diabetes clinic - contact information provided.  - POC Hemoglobin A1C  - glimepiride 4 MG Oral Tab; Take 1 tablet (4 mg total) by mouth 2 (two) times daily.  Dispense: 180 tablet; Refill: 0  - metFORMIN HCl 1000 MG Oral Tab; Take 1 tablet (1,000 mg total) by mouth 2 (two) times daily with meals.  Dispense: 180 tablet; Refill: 0  - atorvastatin 40 MG Oral Tab; Take 1 tablet (40 mg total) by mouth nightly.  Dispense: 90 tablet; Refill: 1  - Comp Metabolic Panel (14); Future  - Lipid Panel; Future  - Microalb/Creat Ratio, Random Urine; Future    3. Tremor of both hands  Intention tremors of both hands.  Advised patient to let us know if these tremors persist/worsen; will refer to Neurology in that case.    4. Screening for malignant neoplasm of prostate  PSA ordered.  - PSA Total, Screen; Future    5. Need for pneumococcal vaccination  Due for Prevnar 20; patient states he will do this shot at a later date/visi.    Patient Care Team:  Kadie Stevenson MD as PCP - General (Internal Medicine)  The patient indicates understanding of these issues and agrees to the plan.  The patient is asked to return to clinic in 6 months for follow up on chronic issues, or earlier if acute issues arise.    Kadie Stevenson MD           [1] No Known Allergies  [2]   Current Outpatient Medications:     glimepiride 4 MG Oral Tab, Take 1 tablet (4 mg total) by mouth 2 (two) times daily., Disp: 180 tablet, Rfl: 0    metFORMIN HCl 1000 MG Oral Tab, Take 1 tablet (1,000 mg total) by mouth 2 (two) times daily with meals., Disp: 180 tablet, Rfl: 0    atorvastatin 40 MG Oral Tab, Take 1 tablet (40 mg total) by mouth  nightly., Disp: 90 tablet, Rfl: 1    Sildenafil Citrate 50 MG Oral Tab, Take 1 tablet (50 mg total) by mouth daily as needed for Erectile Dysfunction., Disp: 8 tablet, Rfl: 5    VASCEPA 1 g Oral Cap, Take 2 capsules by mouth 2 (two) times daily., Disp: 360 capsule, Rfl: 1    ACCU-CHEK JOSE L PLUS In Vitro Strip, TEST BLOOD SUGAR TWICE A DAY, Disp: 200 strip, Rfl: 2    Multiple Vitamins-Minerals Oral Misc, Take 1 tablet by mouth daily., Disp: , Rfl:

## 2025-07-15 DIAGNOSIS — E11.65 UNCONTROLLED TYPE 2 DIABETES MELLITUS WITH HYPERGLYCEMIA, WITHOUT LONG-TERM CURRENT USE OF INSULIN (HCC): Chronic | ICD-10-CM

## 2025-07-15 NOTE — TELEPHONE ENCOUNTER
Requesting    Name from pharmacy: GLIMEPIRIDE 4 MG TABLET         Will file in chart as: GLIMEPIRIDE 4 MG Oral Tab    Sig: TAKE 1 TABLET BY MOUTH 2 TIMES DAILY.    Disp: 180 tablet    Refills: 0 (Pharmacy requested: Not specified)    Start: 7/15/2025    Class: Normal    Non-formulary For: Uncontrolled type 2 diabetes mellitus with hyperglycemia, without long-term current use of insulin (HCC)    Last ordered: 2 months ago (4/18/2025) by Kadie Stevenson MD    Last refill: 4/18/2025    Rx #: [id number]    Diabetes Medication Protocol Pbaufp12/15/2025 12:06 AM   Protocol Details Last A1C < 7.5 and within past 6 months    Microalbumin procedure in past 12 months or taking ACE/ARB    EGFRCR or GFRNAA > 50    GFR in the past 12 months    In person appointment or virtual visit in the past 6 mos or appointment in next 3 mos    Medication is active on med list      To be filled at: [facility]/pharmacy #[id number] - [place], [place] - [address] [facility], [phone], [phone]     LOV: 04/18/25 w/ DES  RTC: 10/18/25  Last Relevant Labs: 01/06/24  No future appointments.

## 2025-07-16 RX ORDER — GLIMEPIRIDE 4 MG/1
4 TABLET ORAL 2 TIMES DAILY
Qty: 180 TABLET | Refills: 0 | Status: SHIPPED | OUTPATIENT
Start: 2025-07-16

## 2025-07-22 ENCOUNTER — LAB ENCOUNTER (OUTPATIENT)
Dept: LAB | Age: 60
End: 2025-07-22
Attending: INTERNAL MEDICINE
Payer: COMMERCIAL

## 2025-07-22 DIAGNOSIS — Z12.5 SCREENING FOR MALIGNANT NEOPLASM OF PROSTATE: ICD-10-CM

## 2025-07-22 DIAGNOSIS — E78.00 HYPERCHOLESTEROLEMIA: Chronic | ICD-10-CM

## 2025-07-22 DIAGNOSIS — Z01.84 IMMUNITY STATUS TESTING: ICD-10-CM

## 2025-07-22 DIAGNOSIS — E11.65 UNCONTROLLED TYPE 2 DIABETES MELLITUS WITH HYPERGLYCEMIA, WITHOUT LONG-TERM CURRENT USE OF INSULIN (HCC): Chronic | ICD-10-CM

## 2025-07-22 LAB
ALBUMIN SERPL-MCNC: 4.7 G/DL (ref 3.2–4.8)
ALBUMIN/GLOB SERPL: 2 {RATIO} (ref 1–2)
ALP LIVER SERPL-CCNC: 50 U/L (ref 45–117)
ALT SERPL-CCNC: 22 U/L (ref 10–49)
ANION GAP SERPL CALC-SCNC: 8 MMOL/L (ref 0–18)
AST SERPL-CCNC: 21 U/L (ref ?–34)
BILIRUB SERPL-MCNC: 1.2 MG/DL (ref 0.3–1.2)
BUN BLD-MCNC: 9 MG/DL (ref 9–23)
CALCIUM BLD-MCNC: 9 MG/DL (ref 8.7–10.6)
CHLORIDE SERPL-SCNC: 104 MMOL/L (ref 98–112)
CHOLEST SERPL-MCNC: 139 MG/DL (ref ?–200)
CO2 SERPL-SCNC: 27 MMOL/L (ref 21–32)
COMPLEXED PSA SERPL-MCNC: 0.37 NG/ML (ref ?–4)
CREAT BLD-MCNC: 0.89 MG/DL (ref 0.7–1.3)
CREAT UR-SCNC: 139.6 MG/DL
EGFRCR SERPLBLD CKD-EPI 2021: 99 ML/MIN/1.73M2 (ref 60–?)
FASTING PATIENT LIPID ANSWER: YES
FASTING STATUS PATIENT QL REPORTED: YES
GLOBULIN PLAS-MCNC: 2.4 G/DL (ref 2–3.5)
GLUCOSE BLD-MCNC: 202 MG/DL (ref 70–99)
HDLC SERPL-MCNC: 38 MG/DL (ref 40–59)
LDLC SERPL CALC-MCNC: 59 MG/DL (ref ?–100)
MICROALBUMIN UR-MCNC: 7.2 MG/DL
MICROALBUMIN/CREAT 24H UR-RTO: 51.6 UG/MG (ref ?–30)
NONHDLC SERPL-MCNC: 101 MG/DL (ref ?–130)
OSMOLALITY SERPL CALC.SUM OF ELEC: 292 MOSM/KG (ref 275–295)
POTASSIUM SERPL-SCNC: 3.7 MMOL/L (ref 3.5–5.1)
PROT SERPL-MCNC: 7.1 G/DL (ref 5.7–8.2)
RUBV IGG SER QL: POSITIVE
RUBV IGG SER-ACNC: 29 IU/ML (ref 10–?)
SODIUM SERPL-SCNC: 139 MMOL/L (ref 136–145)
TRIGL SERPL-MCNC: 264 MG/DL (ref 30–149)
VLDLC SERPL CALC-MCNC: 39 MG/DL (ref 0–30)

## 2025-07-22 PROCEDURE — 86735 MUMPS ANTIBODY: CPT

## 2025-07-22 PROCEDURE — 86762 RUBELLA ANTIBODY: CPT

## 2025-07-22 PROCEDURE — 86787 VARICELLA-ZOSTER ANTIBODY: CPT

## 2025-07-22 PROCEDURE — 82043 UR ALBUMIN QUANTITATIVE: CPT

## 2025-07-22 PROCEDURE — 86765 RUBEOLA ANTIBODY: CPT

## 2025-07-22 PROCEDURE — 80061 LIPID PANEL: CPT

## 2025-07-22 PROCEDURE — 36415 COLL VENOUS BLD VENIPUNCTURE: CPT

## 2025-07-22 PROCEDURE — 80053 COMPREHEN METABOLIC PANEL: CPT

## 2025-07-22 PROCEDURE — 82570 ASSAY OF URINE CREATININE: CPT

## 2025-07-23 LAB
MEV IGG SER-ACNC: 122 AU/ML (ref 16.5–?)
MUV IGG SER IA-ACNC: 119 AU/ML (ref 11–?)
VZV IGG SER IA-ACNC: 49.8 (ref 1–?)

## (undated) DIAGNOSIS — E11.65 UNCONTROLLED TYPE 2 DIABETES MELLITUS WITH HYPERGLYCEMIA, WITHOUT LONG-TERM CURRENT USE OF INSULIN (HCC): Chronic | ICD-10-CM

## (undated) DIAGNOSIS — E11.65 UNCONTROLLED TYPE 2 DIABETES MELLITUS WITH HYPERGLYCEMIA, WITHOUT LONG-TERM CURRENT USE OF INSULIN (HCC): ICD-10-CM

## (undated) DIAGNOSIS — E78.00 HYPERCHOLESTEROLEMIA: ICD-10-CM

## (undated) DEVICE — SUTURE ETHIBOND EXCEL 2-0 SH

## (undated) DEVICE — BREAST-HERNIA-PORT CDS-LF: Brand: MEDLINE INDUSTRIES, INC.

## (undated) DEVICE — SUTURE VICRYL 3-0 SH

## (undated) DEVICE — GAMMEX® NON-LATEX PI TEXTURED SIZE 7.5, STERILE POLYISOPRENE POWDER-FREE SURGICAL GLOVE: Brand: GAMMEX

## (undated) DEVICE — SOL  .9 1000ML BTL

## (undated) DEVICE — KENDALL SCD EXPRESS SLEEVES, KNEE LENGTH, MEDIUM: Brand: KENDALL SCD

## (undated) DEVICE — SUTURE MONOCRYL 4-0 PS-2

## (undated) NOTE — Clinical Note
March 8, 2017      [facility]      Dear Joe Glover:    Our records indicate that you are due for one or more of the following Diabetic tests:      __X___Eye exam    __X___Diabetic Labs      __X___  Sascha Viveros

## (undated) NOTE — Clinical Note
Call patient. Find out when last diabetic eye exam took place. Leilani Mcmanus. Rockne Najjar, MD Diplomate, American Board of Internal Medicine [facility] [address] [facility],[address], [address], [facility], [address] T: [phone]; F: [address]

## (undated) NOTE — LETTER
Hortencia Mendoza [address] [address]  [address], [address] [facility]    Consent for Operation  Date: __________________                                Time: _______________    1.  I authorize the performance upon Onofre Gaffney the following operation:  Proce procedure has been videotaped, the surgeon will obtain the original videotape. The hospital will not be responsible for storage or maintenance of this tape.   7. For the purpose of advancing medical education, I consent to the admittance of observers to the STATEMENTS REQUIRING INSERTION OR COMPLETION WERE FILLED IN.     Signature of Patient:   ___________________________    When the patient is a minor or mentally incompetent to give consent:  Signature of person authorized to consent for patient: ____________ supplements, and pills I can buy without a prescription (including street drugs/illegal medications). Failure to inform my anesthesiologist about these medicines may increase my risk of anesthetic complications. iv.  If I am allergic to anything or have ha Anesthesiologist Signature     Date   Time  I have discussed the procedure and information above with the patient (or patient’s representative) and answered their questions. The patient or their representative has agreed to have anesthesia services.     ___

## (undated) NOTE — LETTER
11/26/18        Onofre Gaffney  [address]      Dear Yamil Reyes,    Our records indicate that you have outstanding lab work and or testing that was ordered for you and has not yet been completed:  Orders Placed This Encou

## (undated) NOTE — LETTER
July 19, 2019    Onofre Baker 55  [address] [facility]        Dear Tamar Galloway ,     Being proactive in the management of your health will help you to achieve and maintain a positive quality of life and help to minimize complications f